# Patient Record
Sex: FEMALE | Race: WHITE | NOT HISPANIC OR LATINO | URBAN - METROPOLITAN AREA
[De-identification: names, ages, dates, MRNs, and addresses within clinical notes are randomized per-mention and may not be internally consistent; named-entity substitution may affect disease eponyms.]

---

## 2017-02-15 PROBLEM — K21.9 GASTROESOPHAGEAL REFLUX DISEASE: Status: ACTIVE | Noted: 2017-02-15

## 2018-01-22 ENCOUNTER — APPOINTMENT (RX ONLY)
Dept: URBAN - METROPOLITAN AREA CLINIC 23 | Facility: CLINIC | Age: 68
Setting detail: DERMATOLOGY
End: 2018-01-22

## 2018-01-22 DIAGNOSIS — L82.1 OTHER SEBORRHEIC KERATOSIS: ICD-10-CM

## 2018-01-22 DIAGNOSIS — L82.0 INFLAMED SEBORRHEIC KERATOSIS: ICD-10-CM

## 2018-01-22 DIAGNOSIS — L91.8 OTHER HYPERTROPHIC DISORDERS OF THE SKIN: ICD-10-CM

## 2018-01-22 DIAGNOSIS — T07XXXA OTHER AND UNSPECIFIED SUPERFICIAL INJURY OF OTHER, MULTIPLE, AND UNSPECIFIED SITES, WITHOUT MENTION OF INFECTION: ICD-10-CM

## 2018-01-22 DIAGNOSIS — L29.8 OTHER PRURITUS: ICD-10-CM

## 2018-01-22 DIAGNOSIS — D18.0 HEMANGIOMA: ICD-10-CM

## 2018-01-22 DIAGNOSIS — L81.4 OTHER MELANIN HYPERPIGMENTATION: ICD-10-CM

## 2018-01-22 DIAGNOSIS — L29.89 OTHER PRURITUS: ICD-10-CM

## 2018-01-22 PROBLEM — D18.01 HEMANGIOMA OF SKIN AND SUBCUTANEOUS TISSUE: Status: ACTIVE | Noted: 2018-01-22

## 2018-01-22 PROBLEM — S50.912A UNSPECIFIED SUPERFICIAL INJURY OF LEFT FOREARM, INITIAL ENCOUNTER: Status: ACTIVE | Noted: 2018-01-22

## 2018-01-22 PROBLEM — L55.1 SUNBURN OF SECOND DEGREE: Status: ACTIVE | Noted: 2018-01-22

## 2018-01-22 PROBLEM — L85.3 XEROSIS CUTIS: Status: ACTIVE | Noted: 2018-01-22

## 2018-01-22 PROBLEM — S50.911A UNSPECIFIED SUPERFICIAL INJURY OF RIGHT FOREARM, INITIAL ENCOUNTER: Status: ACTIVE | Noted: 2018-01-22

## 2018-01-22 PROCEDURE — ? TREATMENT REGIMEN

## 2018-01-22 PROCEDURE — ? REFERRAL CORRESPONDENCE

## 2018-01-22 PROCEDURE — ? COUNSELING

## 2018-01-22 PROCEDURE — 99202 OFFICE O/P NEW SF 15 MIN: CPT | Mod: 25

## 2018-01-22 PROCEDURE — 17110 DESTRUCTION B9 LES UP TO 14: CPT

## 2018-01-22 PROCEDURE — ? LIQUID NITROGEN

## 2018-01-22 ASSESSMENT — LOCATION DETAILED DESCRIPTION DERM
LOCATION DETAILED: LEFT SUPERIOR LATERAL MIDBACK
LOCATION DETAILED: SUPERIOR THORACIC SPINE
LOCATION DETAILED: RIGHT ANTERIOR PROXIMAL UPPER ARM
LOCATION DETAILED: LEFT ELBOW
LOCATION DETAILED: LEFT CENTRAL ZYGOMA
LOCATION DETAILED: RIGHT DISTAL POSTERIOR UPPER ARM
LOCATION DETAILED: LEFT DISTAL DORSAL FOREARM
LOCATION DETAILED: RIGHT INFERIOR UPPER BACK
LOCATION DETAILED: RIGHT PROXIMAL DORSAL FOREARM
LOCATION DETAILED: RIGHT ANTERIOR MEDIAL DISTAL UPPER ARM
LOCATION DETAILED: RIGHT DISTAL DORSAL FOREARM
LOCATION DETAILED: LEFT RADIAL DORSAL HAND
LOCATION DETAILED: RIGHT SUPERIOR MEDIAL UPPER BACK
LOCATION DETAILED: LEFT SUPERIOR CENTRAL MALAR CHEEK
LOCATION DETAILED: LEFT PROXIMAL DORSAL FOREARM
LOCATION DETAILED: RIGHT RADIAL DORSAL HAND

## 2018-01-22 ASSESSMENT — LOCATION ZONE DERM
LOCATION ZONE: FACE
LOCATION ZONE: HAND
LOCATION ZONE: TRUNK
LOCATION ZONE: ARM

## 2018-01-22 ASSESSMENT — LOCATION SIMPLE DESCRIPTION DERM
LOCATION SIMPLE: LEFT LOWER BACK
LOCATION SIMPLE: RIGHT HAND
LOCATION SIMPLE: UPPER BACK
LOCATION SIMPLE: RIGHT UPPER ARM
LOCATION SIMPLE: RIGHT FOREARM
LOCATION SIMPLE: LEFT HAND
LOCATION SIMPLE: LEFT ELBOW
LOCATION SIMPLE: LEFT ZYGOMA
LOCATION SIMPLE: LEFT CHEEK
LOCATION SIMPLE: LEFT FOREARM
LOCATION SIMPLE: RIGHT POSTERIOR UPPER ARM
LOCATION SIMPLE: RIGHT UPPER BACK

## 2018-01-22 NOTE — PROCEDURE: TREATMENT REGIMEN
Otc Regimen: Zyrtec 10mg QHS
Detail Level: Zone
Samples Given: CeraVe anti-itch cream
Plan: Limit soap to underarms and groin

## 2018-01-22 NOTE — PROCEDURE: LIQUID NITROGEN
Post-Care Instructions: I reviewed with the patient in detail post-care instructions. Patient is to wear sunprotection, and avoid picking at any of the treated lesions. Pt may apply Vaseline to crusted or scabbing areas.
Medical Necessity Information: It is in your best interest to select a reason for this procedure from the list below. All of these items fulfill various CMS LCD requirements except the new and changing color options.
Add 52 Modifier (Optional): no
Detail Level: Detailed
Consent: The patient's consent was obtained including but not limited to risks of crusting, scabbing, blistering, scarring, darker or lighter pigmentary change, recurrence, incomplete removal and infection.
Medical Necessity Clause: This procedure was medically necessary because the lesions that were treated were:

## 2018-03-02 ENCOUNTER — HOSPITAL ENCOUNTER (OUTPATIENT)
Dept: MAMMOGRAPHY | Age: 68
Discharge: HOME | End: 2018-03-02
Attending: INTERNAL MEDICINE
Payer: MEDICARE

## 2018-03-02 DIAGNOSIS — M89.9 BONE DISORDER: ICD-10-CM

## 2018-03-02 DIAGNOSIS — Z78.0 POST-MENOPAUSAL: ICD-10-CM

## 2018-03-02 DIAGNOSIS — Z12.31 ENCOUNTER FOR SCREENING MAMMOGRAM FOR MALIGNANT NEOPLASM OF BREAST: ICD-10-CM

## 2018-03-02 DIAGNOSIS — E66.9 OBESITY (BMI 35.0-39.9 WITHOUT COMORBIDITY): ICD-10-CM

## 2018-03-02 DIAGNOSIS — Z13.820 ENCOUNTER FOR SCREENING FOR OSTEOPOROSIS: ICD-10-CM

## 2018-03-02 PROCEDURE — 77080 DXA BONE DENSITY AXIAL: CPT

## 2018-03-02 PROCEDURE — 77067 SCR MAMMO BI INCL CAD: CPT

## 2019-05-15 ENCOUNTER — HOSPITAL ENCOUNTER (OUTPATIENT)
Dept: CT IMAGING | Age: 69
Discharge: HOME | End: 2019-05-15
Attending: INTERNAL MEDICINE
Payer: MEDICARE

## 2019-05-15 DIAGNOSIS — R29.6 MULTIPLE FALLS: ICD-10-CM

## 2019-05-15 DIAGNOSIS — R55 SYNCOPE, UNSPECIFIED SYNCOPE TYPE: ICD-10-CM

## 2019-05-15 DIAGNOSIS — R10.12 ABDOMINAL PAIN, LUQ (LEFT UPPER QUADRANT): ICD-10-CM

## 2019-05-15 PROCEDURE — 74011000258 HC RX REV CODE- 258: Performed by: INTERNAL MEDICINE

## 2019-05-15 PROCEDURE — 74011636320 HC RX REV CODE- 636/320: Performed by: INTERNAL MEDICINE

## 2019-05-15 PROCEDURE — 74177 CT ABD & PELVIS W/CONTRAST: CPT

## 2019-05-15 RX ORDER — SODIUM CHLORIDE 0.9 % (FLUSH) 0.9 %
10 SYRINGE (ML) INJECTION
Status: COMPLETED | OUTPATIENT
Start: 2019-05-15 | End: 2019-05-15

## 2019-05-15 RX ADMIN — IOPAMIDOL 100 ML: 755 INJECTION, SOLUTION INTRAVENOUS at 10:41

## 2019-05-15 RX ADMIN — DIATRIZOATE MEGLUMINE AND DIATRIZOATE SODIUM 15 ML: 660; 100 LIQUID ORAL; RECTAL at 10:41

## 2019-05-15 RX ADMIN — Medication 10 ML: at 10:41

## 2019-05-15 RX ADMIN — SODIUM CHLORIDE 100 ML: 900 INJECTION, SOLUTION INTRAVENOUS at 10:41

## 2019-06-06 ENCOUNTER — HOSPITAL ENCOUNTER (OUTPATIENT)
Dept: MRI IMAGING | Age: 69
Discharge: HOME | End: 2019-06-06
Attending: INTERNAL MEDICINE
Payer: MEDICARE

## 2019-06-06 DIAGNOSIS — R29.6 MULTIPLE FALLS: ICD-10-CM

## 2019-06-06 DIAGNOSIS — F41.8 DEPRESSION WITH ANXIETY: ICD-10-CM

## 2019-06-06 DIAGNOSIS — R55 SYNCOPE, UNSPECIFIED SYNCOPE TYPE: ICD-10-CM

## 2019-06-06 PROBLEM — E66.01 SEVERE OBESITY (HCC): Status: ACTIVE | Noted: 2019-06-06

## 2019-06-06 PROCEDURE — 70551 MRI BRAIN STEM W/O DYE: CPT

## 2019-06-11 ENCOUNTER — HOSPITAL ENCOUNTER (OUTPATIENT)
Dept: PHYSICAL THERAPY | Age: 69
End: 2019-06-11
Payer: MEDICARE

## 2019-06-20 ENCOUNTER — HOSPITAL ENCOUNTER (OUTPATIENT)
Dept: PHYSICAL THERAPY | Age: 69
Discharge: HOME | End: 2019-06-20
Payer: MEDICARE

## 2019-06-20 PROCEDURE — 97162 PT EVAL MOD COMPLEX 30 MIN: CPT

## 2019-06-20 PROCEDURE — 97530 THERAPEUTIC ACTIVITIES: CPT

## 2019-06-20 NOTE — PROGRESS NOTES
Andrew Morales  : 1950  [place]  [place] [address] [address].  [phone]   [phone]      OUTPATIENT PHYSICAL THERAPY: Daily Treatment Note  2019   ICD-10: Treatment Diagnosis:Muscle weakness (generalized) (M62.81)Other abnormalities of gait and mobility (R26.89       Effective Dates: 2019 TO 2019 (90 days). Frequency/Duration: 2 times a week for 90 Days  GOALS: (Goals have been discussed and agreed upon with patient.)  Short-Term Functional Goals: Time Frame: 4 weeks  1. Pt to report compliance with HEP. 2. Pt to decreased back pain with activity. Discharge Goals: Time Frame: 12 weeks  1. Pt to demonstrate quad strength for repeated sit to stand without compensation. 2. Pt to demonstrate hip strength for reciprocal pattern on stairs. 3. Pt to demonstrate > 15 sec SLS for safe amb all surfaces  _________________________________________________________________________  Pre-treatment Symptoms/Complaints:  Ms. Blake Person presents with core weakness and poor balance with multiple falls reported making her a continued high fall risk  Pain: Initial: 9/10 Post Session:  2/10   Medications Last Reviewed:  2019    Updated Objective Findings:     Strength:      4/5 hip ext, abd B        Functional Mobility:         Gait/Ambulation:  WNL level surfaces in clinic        Stairs:  Step-to pattern  Balance:          R 4 sec, L 7 sec     Tool Used: Lower Extremity Functional Scale (LEFS)  Score:  Initial:  Most Recent:  (Date: -- )   Interpretation of Score: 20 questions each scored on a 5 point scale with 0 representing \"extreme difficulty or unable to perform\" and 4 representing \"no difficulty\". The lower the score, the greater the functional disability. 80/80 represents no disability. Minimal detectable change is 9 points.      See evaluation note from today     TREATMENT:   THERAPEUTIC ACTIVITY: ( see below for minutes): Therapeutic activities per grid below to improve mobility. Required moderate verbal and manual cues to improve functional mobility . THERAPEUTIC EXERCISE: (see below for minutes):  Exercises per grid below to improve strength. Required moderate verbal and manual cues to promote proper body alignment, promote proper body posture, promote proper body mechanics and promote proper body breathing techniques. Progressed resistance, range, repetitions and complexity of movement as indicated. MANUAL THERAPY: (see below for minutes): Joint mobilization and Soft tissue mobilization was utilized and necessary because of the patient's restricted joint motion, painful spasm, loss of articular motion and restricted motion of soft tissue. MODALITIES: (see below for minutes):      for pain modulation       Date: 6/20/19       Modalities:                        Therapeutic Exercise: 5 mins       Glut set X 20                                               Manual Therapy:                        Therapeutic Activities: 25 mins       Pt education, postural education, HEP 10 mins       Bridging without hams, paraspinals X 15       Sit to stand without UE support X 10       SLS B X 3         HEP: see hand out    [facility] Portal  Treatment/Session Summary:    · Response to Treatment:  Pt had good understanding on ex's and their rationale. She reported much less pain after ex's. · Communication/Consultation:  None today  · Equipment provided today:  None today  · Recommendations/Intent for next treatment session: Next visit will focus on strengthening and balance as indicated.     Total Treatment Billable Duration:  45 mins  PT Patient Time In/Time Out  Time In: 0800  Time Out: 0845    Allison Lucas PT

## 2019-06-20 NOTE — THERAPY EVALUATION
Marylou Crain  : 1950 [id number] [facility]  [address] [place] [address].  [phone]   [phone]        OUTPATIENT PHYSICAL THERAPY:Initial Assessment 2019         ICD-10: Treatment Diagnosis:Muscle weakness (generalized) (M62.81)Other abnormalities of gait and mobility (R26.89)  Precautions/Allergies:   Demerol [meperidine] and Other medication   Fall Risk Score:     Ambulatory/Rehab Services H2 Model Falls Risk Assessment    Risk Factors:       (5)  History of Recent Falls [w/in 3 months] Ability to Rise from Chair:       (0)  Ability to rise in a single movement    Falls Prevention Plan:       No modifications necessary   Total: (5 or greater = High Risk): 5     [facility]. All Rights Reserved. [facility] [employer] Patent #[id number]. Federal Law prohibits the replication, distribution or use without written permission from [facility] [employer]     MD Orders: eval and treat MEDICAL/REFERRING DIAGNOSIS:  Repeated falls [R29.6]   DATE OF ONSET: months  REFERRING PHYSICIAN: Bk Polanco MD  RETURN PHYSICIAN APPOINTMENT: unknown     INITIAL ASSESSMENT:  Ms. Jefferson Irwin presents with core weakness and poor balance with multiple falls reported making her a continued high fall risk. She will benefit from skilled PT to restore strength and balance to return her to safe ambulation on all surfaces. PROBLEM LIST (Impacting functional limitations):  1. Decreased Strength  2. Decreased ADL/Functional Activities  3. Decreased Ambulation Ability/Technique  4. Decreased Balance  5. Increased Pain  6. Decreased [place] with Home Exercise Program INTERVENTIONS PLANNED:  1. Balance Exercise  2. Home Exercise Program (HEP)  3. Therapeutic Activites  4. Therapeutic Exercise/Strengthening    TREATMENT PLAN:  Effective Dates: 2019 TO 2019 (90 days).  Frequency/Duration: 2 times a week for 90 Days  GOALS: (Goals have been discussed and agreed upon with patient.)  Short-Term Functional Goals: Time Frame: 4 weeks  1. Pt to report compliance with HEP. 2. Pt to decreased back pain with activity. Discharge Goals: Time Frame: 12 weeks  1. Pt to demonstrate quad strength for repeated sit to stand without compensation. 2. Pt to demonstrate hip strength for reciprocal pattern on stairs. 3. Pt to demonstrate > 15 sec SLS for safe amb all surfaces. Rehabilitation Potential For Stated Goals: Good  Regarding Tian Steven's therapy, I certify that the treatment plan above will be carried out by a therapist or under their direction. Thank you for this referral,  Tariq Dennison, PT       Referring Physician Signature: Bk Polanco MD              Date                      HISTORY:   History of Present Injury/Illness (Reason for Referral):  3 recent falls with no known reason. No dizziness, tripping, or awareness of anything going on. Previously tripped a few times going up her stairs, catching her toe on the lip of the stair. Having pain in left post-lat trunk. Multiple x-rays, CT scan of trunk, EEG, MRI of head, has seen cardiologist.  Does favor the left leg as weaker with stairs. Diagnosed with OA in the left knee. Past Medical History/Comorbidities:   Ms. Jefferson Irwin  has a past medical history of Depression, Depression with anxiety (12/17/2015), Gastroesophageal reflux disease (2/15/2017), Local reaction to bee sting (6-2016), Obesity, CANDIE (obstructive sleep apnea), CANDIE (obstructive sleep apnea), and Prediabetes.  She also has no past medical history of Arrhythmia, Arthritis, Asthma, Autoimmune disease ([address] CAD (coronary artery disease), Cancer ([address] Chronic kidney disease, Chronic pain, Coagulation defects, COPD, Diabetes ([address] Difficult intubation, GERD (gastroesophageal reflux disease), Heart failure ([address] Hypertension, Liver disease, Malignant hyperthermia due to anesthesia, Nausea & vomiting, Other ill-defined conditions(799.89), Pseudocholinesterase deficiency, PUD (peptic ulcer disease), Seizures ([facility] Stroke ([facility] Thromboembolus ([facility] [address] [facility] Thyroid disease, Unspecified adverse effect of anesthesia, or Unspecified sleep apnea. Ms. Ninfa Godoy  has a past surgical history that includes hx appendectomy; hx bunionectomy (Bilateral); hx wisdom teeth extraction; hx tonsillectomy; hx adenoidectomy; and hx hysterectomy. Social History/Living Environment:     pt lives with  in 2 story home  Prior Level of Function/Work/Activity:  retired  Dominant Side:         RIGHT  Current Medications:    Current Outpatient Medications:     omeprazole (PRILOSEC) 20 mg capsule, Take 1 Cap by mouth daily. , Disp: 30 Cap, Rfl: 1    FLUoxetine (PROZAC) 10 mg capsule, Take 1 Cap by mouth daily. (Patient taking differently: Take 20 mg by mouth daily.), Disp: 30 Cap, Rfl: 1    naproxen sodium (ALEVE) 220 mg cap, Take  by mouth., Disp: , Rfl:     triamcinolone acetonide (KENALOG) 0.1 % topical cream, use thin layer to affected area twice daily for 1 week then reduce to daily as needed, Disp: 45 g, Rfl: 0    EPIPEN 2-JOSUE 0.3 mg/0.3 mL injection, , Disp: , Rfl: 0    triamcinolone (NASACORT AQ) 55 mcg nasal inhaler, 2 Sprays by Both Nostrils route daily. , Disp: , Rfl:     sodium chloride (OCEAN) 0.65 % nasal spray, 1 Spray as needed for Congestion. , Disp: , Rfl:    Date Last Reviewed:  6/20/2019   # of Personal Factors/Comorbidities that affect the Plan of Care: 1-2: MODERATE COMPLEXITY   EXAMINATION:   Observation/Orthostatic Postural Assessment:          Noncontributory   Palpation:          unremarkable  ROM:     WFL      Strength:     4/5 hip ext, abd B      Neurological Screen:        unremarkable  Functional Mobility:         Gait/Ambulation:  WNL level surfaces in clinic        Stairs:  Step-to pattern  Balance:          R 4 sec, L 7 sec    Body Structures Involved:  1. Joints  2.  Muscles Body Functions Affected:  1. Sensory/Pain  2. Movement Related Activities and Participation Affected:  1. General Tasks and Demands  2. Self Care  3. Domestic Life  4. Community, Social and [place]   # of elements that affect the Plan of Care: 3: MODERATE COMPLEXITY   CLINICAL PRESENTATION:   Presentation: Evolving clinical presentation with changing clinical characteristics: MODERATE COMPLEXITY   CLINICAL DECISION MAKING:   Tool Used: Lower Extremity Functional Scale (LEFS)  Score:  Initial: 58/80 Most Recent: X/80 (Date: -- )   Interpretation of Score: 20 questions each scored on a 5 point scale with 0 representing \"extreme difficulty or unable to perform\" and 4 representing \"no difficulty\". The lower the score, the greater the functional disability. 80/80 represents no disability. Minimal detectable change is 9 points. Medical Necessity:   · Patient demonstrates good rehab potential due to higher previous functional level. Reason for Services/Other Comments:  · Patient continues to require present interventions due to patient's inability to walk without falls.    Use of outcome tool(s) and clinical judgement create a POC that gives a: Questionable prediction of patient's progress: MODERATE COMPLEXITY     Total Treatment Duration:  PT Patient Time In/Time Out  Time In: 0800  Time Out: 0845     Ge Lucas, PT

## 2019-06-25 ENCOUNTER — HOSPITAL ENCOUNTER (OUTPATIENT)
Dept: PHYSICAL THERAPY | Age: 69
Discharge: HOME | End: 2019-06-25
Payer: MEDICARE

## 2019-06-25 PROCEDURE — 97530 THERAPEUTIC ACTIVITIES: CPT

## 2019-06-25 PROCEDURE — 97110 THERAPEUTIC EXERCISES: CPT

## 2019-06-25 NOTE — PROGRESS NOTES
Scar Storey  : 1950  [place]  Secondary: [place] [address] @ [address]  [address] [address].  [phone]   [phone]      OUTPATIENT PHYSICAL THERAPY: Daily Treatment Note  2019   ICD-10: Treatment Diagnosis:Muscle weakness (generalized) (M62.81)Other abnormalities of gait and mobility (R26.89       Effective Dates: 2019 TO 2019 (90 days). Frequency/Duration: 2 times a week for 90 Days  GOALS: (Goals have been discussed and agreed upon with patient.)  Short-Term Functional Goals: Time Frame: 4 weeks  1. Pt to report compliance with HEP. 2. Pt to decreased back pain with activity. Discharge Goals: Time Frame: 12 weeks  1. Pt to demonstrate quad strength for repeated sit to stand without compensation. 2. Pt to demonstrate hip strength for reciprocal pattern on stairs. 3. Pt to demonstrate > 15 sec SLS for safe amb all surfaces  _________________________________________________________________________  Pre-treatment Symptoms/Complaints:  I have been working on it. All this time I thought it was my left knee but it looks like my R leg is weak too. Pain: Initial: 3/10 Post Session:  Hips are sore but no pain   Medications Last Reviewed:  2019    Updated Objective Findings:     Palpation:  Tender B greater trochanters    Strength:      4/5 hip ext, abd B        Functional Mobility:         Gait/Ambulation:  WNL level surfaces in clinic        Stairs:  Step-to pattern  Balance:          R 4 sec, L 7 sec     Tool Used: Lower Extremity Functional Scale (LEFS)  Score:  Initial: 58/80 Most Recent:  (Date: -- )   Interpretation of Score: 20 questions each scored on a 5 point scale with 0 representing \"extreme difficulty or unable to perform\" and 4 representing \"no difficulty\". The lower the score, the greater the functional disability. 80/80 represents no disability. Minimal detectable change is 9 points. TREATMENT:   THERAPEUTIC ACTIVITY: ( see below for minutes): Therapeutic activities per grid below to improve mobility. Required moderate verbal and manual cues to improve functional mobility . THERAPEUTIC EXERCISE: (see below for minutes):  Exercises per grid below to improve strength. Required moderate verbal and manual cues to promote proper body alignment, promote proper body posture, promote proper body mechanics and promote proper body breathing techniques. Progressed resistance, range, repetitions and complexity of movement as indicated. MANUAL THERAPY: (see below for minutes): Joint mobilization and Soft tissue mobilization was utilized and necessary because of the patient's restricted joint motion, painful spasm, loss of articular motion and restricted motion of soft tissue. MODALITIES: (see below for minutes):      for pain modulation       Date: 6/20/19 6/25/19  Visit 2      Modalities:                        Therapeutic Exercise: 5 mins 15 mins      Glut set X 20 X 15      S/L hip abd B  X 30      Clams B  X 30                              Manual Therapy:                        Therapeutic Activities: 25 mins 30 mins      Pt education, postural education, HEP 10 mins       Bridging without hams, paraspinals X 15 X 30      Sit to stand without UE support X 10 X 15      SLS B, holding pelvis level X 3 X 5      Tandem stance B  X 3        HEP: see hand out    [facility] Portal  Treatment/Session Summary:    · Response to Treatment: Pt has good ex nani and understanding. Able to hold pelvis level while performing SLS for balance, functionally training glut med control. · Communication/Consultation:  None today  · Equipment provided today:  None today  · Recommendations/Intent for next treatment session: Next visit will focus on strengthening and balance as indicated.     Total Treatment Billable Duration:  45 mins  PT Patient Time In/Time Out  Time In: 6992  Time Out: 600 [address] [place] [address]

## 2019-06-28 ENCOUNTER — HOSPITAL ENCOUNTER (OUTPATIENT)
Dept: PHYSICAL THERAPY | Age: 69
Discharge: HOME | End: 2019-06-28
Payer: MEDICARE

## 2019-06-28 PROCEDURE — 97110 THERAPEUTIC EXERCISES: CPT

## 2019-06-28 PROCEDURE — 97530 THERAPEUTIC ACTIVITIES: CPT

## 2019-06-28 NOTE — PROGRESS NOTES
Danilele Reach  : 1950  [place]  Secondary: [place] [address] @ [address], [facility] [place].  [phone]   [phone]      OUTPATIENT PHYSICAL THERAPY: Daily Treatment Note  2019   ICD-10: Treatment Diagnosis:Muscle weakness (generalized) (M62.81)Other abnormalities of gait and mobility (R26.89       Effective Dates: 2019 TO 2019 (90 days). Frequency/Duration: 2 times a week for 90 Days  GOALS: (Goals have been discussed and agreed upon with patient.)  Short-Term Functional Goals: Time Frame: 4 weeks  1. Pt to report compliance with HEP. 2. Pt to decreased back pain with activity. Discharge Goals: Time Frame: 12 weeks  1. Pt to demonstrate quad strength for repeated sit to stand without compensation. 2. Pt to demonstrate hip strength for reciprocal pattern on stairs. 3. Pt to demonstrate > 15 sec SLS for safe amb all surfaces  _________________________________________________________________________  Pre-treatment Symptoms/Complaints:  I am sore in my hips and a little in my back. Pain: Initial: 3/10 Post Session:  No pain, just some soreness   Medications Last Reviewed:  2019    Updated Objective Findings:     Palpation:  Tender B greater trochanters;  Marked paraspinal tightness B    Strength:      4/5 hip ext, abd B        Functional Mobility:         Gait/Ambulation:  WNL level surfaces in clinic        Stairs:  Step-to pattern  Balance:          R 4 sec, L 7 sec     Tool Used: Lower Extremity Functional Scale (LEFS)  Score:  Initial:  Most Recent:  (Date: -- )   Interpretation of Score: 20 questions each scored on a 5 point scale with 0 representing \"extreme difficulty or unable to perform\" and 4 representing \"no difficulty\". The lower the score, the greater the functional disability. 80/80 represents no disability.   Minimal detectable change is 9 points. TREATMENT:   THERAPEUTIC ACTIVITY: ( see below for minutes): Therapeutic activities per grid below to improve mobility. Required moderate verbal and manual cues to improve functional mobility . THERAPEUTIC EXERCISE: (see below for minutes):  Exercises per grid below to improve strength. Required moderate verbal and manual cues to promote proper body alignment, promote proper body posture, promote proper body mechanics and promote proper body breathing techniques. Progressed resistance, range, repetitions and complexity of movement as indicated. MANUAL THERAPY: (see below for minutes): Joint mobilization and Soft tissue mobilization was utilized and necessary because of the patient's restricted joint motion, painful spasm, loss of articular motion and restricted motion of soft tissue. MODALITIES: (see below for minutes):      for pain modulation       Date: 6/20/19 6/25/19  Visit 2 6/28/19  Visit 3       Modalities:                              Therapeutic Exercise: 5 mins 15 mins 15 mins       Glut set X 20 X 15 X 15       S/L hip abd B  X 30 X 30       Clams B  X 30 X 30                                     Manual Therapy:                              Therapeutic Activities: 25 mins 30 mins 30 mins       Pt education, postural education, HEP 10 mins         Bridging without hams, paraspinals X 15 X 30 X 30       Sit to stand without UE support X 10 X 15 X 20       SLS B, holding pelvis level X 3 X 5 X 5       Tandem stance B  X 3 X 2   twists         HEP: see hand out    [facility] Portal  Treatment/Session Summary:    · Response to Treatment: Pt is showing good ex nani. Over recruits back so focusing on keeping it relaxed while she engages gluts and quads more. · Communication/Consultation:  None today  · Equipment provided today:  None today  · Recommendations/Intent for next treatment session: Next visit will focus on strengthening and balance as indicated.     Total Treatment Billable Duration:  45 mins  PT Patient Time In/Time Out  Time In: 7142  Time Out: 0500    Tanna Lucas, PT

## 2019-07-02 ENCOUNTER — HOSPITAL ENCOUNTER (OUTPATIENT)
Dept: PHYSICAL THERAPY | Age: 69
Discharge: HOME | End: 2019-07-02
Payer: MEDICARE

## 2019-07-02 PROCEDURE — 97530 THERAPEUTIC ACTIVITIES: CPT

## 2019-07-02 PROCEDURE — 97110 THERAPEUTIC EXERCISES: CPT

## 2019-07-02 NOTE — PROGRESS NOTES
Tyrel Sky  : 1950  [place]  Secondary: [place] [address] @ [address], [place].  [phone]   [phone]      OUTPATIENT PHYSICAL THERAPY: Daily Treatment Note  2019   ICD-10: Treatment Diagnosis:Muscle weakness (generalized) (M62.81)Other abnormalities of gait and mobility (R26.89       Effective Dates: 2019 TO 2019 (90 days). Frequency/Duration: 2 times a week for 90 Days  GOALS: (Goals have been discussed and agreed upon with patient.)  Short-Term Functional Goals: Time Frame: 4 weeks  1. Pt to report compliance with HEP. 2. Pt to decreased back pain with activity. Discharge Goals: Time Frame: 12 weeks  1. Pt to demonstrate quad strength for repeated sit to stand without compensation. 2. Pt to demonstrate hip strength for reciprocal pattern on stairs. 3. Pt to demonstrate > 15 sec SLS for safe amb all surfaces  _________________________________________________________________________  Pre-treatment Symptoms/Complaints: I still have trouble balancing on the right leg. Pain: Initial: 0/10 Post Session:  0/10   Medications Last Reviewed:  2019    Updated Objective Findings:     Palpation:  Tender B greater trochanters;  Marked paraspinal tightness B    Strength:      4/5 hip ext, abd B        Functional Mobility:         Gait/Ambulation:  WNL level surfaces in clinic        Stairs:  Step-to pattern  Balance:          R 17 sec, L 20 sec 19    Tool Used: Lower Extremity Functional Scale (LEFS)  Score:  Initial: 58/80 Most Recent:  (Date: -- )   Interpretation of Score: 20 questions each scored on a 5 point scale with 0 representing \"extreme difficulty or unable to perform\" and 4 representing \"no difficulty\". The lower the score, the greater the functional disability. 80/80 represents no disability. Minimal detectable change is 9 points. TREATMENT:   THERAPEUTIC ACTIVITY: ( see below for minutes): Therapeutic activities per grid below to improve mobility. Required moderate verbal and manual cues to improve functional mobility . THERAPEUTIC EXERCISE: (see below for minutes):  Exercises per grid below to improve strength. Required moderate verbal and manual cues to promote proper body alignment, promote proper body posture, promote proper body mechanics and promote proper body breathing techniques. Progressed resistance, range, repetitions and complexity of movement as indicated. MANUAL THERAPY: (see below for minutes): Joint mobilization and Soft tissue mobilization was utilized and necessary because of the patient's restricted joint motion, painful spasm, loss of articular motion and restricted motion of soft tissue. MODALITIES: (see below for minutes):      for pain modulation       Date: 6/20/19 6/25/19  Visit 2 6/28/19  Visit 3 7/2/19  Visit 4      Modalities:                              Therapeutic Exercise: 5 mins 15 mins 15 mins 10 mins      Glut set X 20 X 15 X 15       S/L hip abd B  X 30 X 30 X 30      Clams B  X 30 X 30 X 30                                    Manual Therapy:                              Therapeutic Activities: 25 mins 30 mins 30 mins 35 mins      Pt education, postural education, HEP 10 mins         Bridging without hams, paraspinals X 15 X 30 X 30 X 30      Sit to stand without UE support X 10 X 15 X 20 X 20      SLS B, holding pelvis level X 3 X 5 X 5 X 5      Tandem stance B  X 3 X 2   twists X 2   Twists  Floor and foam        HEP: see hand out    [facility] Portal  Treatment/Session Summary:    · Response to Treatment:  Pt has shown good progress with balance. Continue as indicated. · Communication/Consultation:  None today  · Equipment provided today:  None today  · Recommendations/Intent for next treatment session: Next visit will focus on strengthening and balance as indicated.     Total Treatment Billable Duration:  45 mins  PT Patient Time In/Time Out  Time In: 0800  Time Out: 0845    Aleks Lucas, PT

## 2019-07-05 ENCOUNTER — HOSPITAL ENCOUNTER (OUTPATIENT)
Dept: PHYSICAL THERAPY | Age: 69
Discharge: HOME | End: 2019-07-05
Payer: MEDICARE

## 2019-07-05 PROCEDURE — 97110 THERAPEUTIC EXERCISES: CPT

## 2019-07-05 PROCEDURE — 97530 THERAPEUTIC ACTIVITIES: CPT

## 2019-07-05 NOTE — PROGRESS NOTES
Wilmar Cadena  : 1950  [place]  Secondary: [place] [address] E [address] @ [place] [address].  [phone]   [phone]      OUTPATIENT PHYSICAL THERAPY: Daily Treatment Note  2019   ICD-10: Treatment Diagnosis:Muscle weakness (generalized) (M62.81)Other abnormalities of gait and mobility (R26.89       Effective Dates: 2019 TO 2019 (90 days). Frequency/Duration: 2 times a week for 90 Days  GOALS: (Goals have been discussed and agreed upon with patient.)  Short-Term Functional Goals: Time Frame: 4 weeks  1. Pt to report compliance with HEP. 2. Pt to decreased back pain with activity. Discharge Goals: Time Frame: 12 weeks  1. Pt to demonstrate quad strength for repeated sit to stand without compensation. 2. Pt to demonstrate hip strength for reciprocal pattern on stairs. 3. Pt to demonstrate > 15 sec SLS for safe amb all surfaces  _________________________________________________________________________  Pre-treatment Symptoms/Complaints:  I am doing okay for the most part. No real issues lately. Pain: Initial: 0/10 Post Session:  0/10   Medications Last Reviewed:  2019    Updated Objective Findings:     Palpation:  Tender B greater trochanters;  Marked paraspinal tightness B    Strength:      4/5 hip ext, abd B        Functional Mobility:         Gait/Ambulation:  WNL level surfaces in clinic        Stairs:  Step-to pattern  Balance:          R 17 sec, L 20 sec 19    Tool Used: Lower Extremity Functional Scale (LEFS)  Score:  Initial: 58/80 Most Recent:  (Date: -- )   Interpretation of Score: 20 questions each scored on a 5 point scale with 0 representing \"extreme difficulty or unable to perform\" and 4 representing \"no difficulty\". The lower the score, the greater the functional disability. 80/80 represents no disability. Minimal detectable change is 9 points. TREATMENT:   THERAPEUTIC ACTIVITY: ( see below for minutes): Therapeutic activities per grid below to improve mobility. Required moderate verbal and manual cues to improve functional mobility . THERAPEUTIC EXERCISE: (see below for minutes):  Exercises per grid below to improve strength. Required moderate verbal and manual cues to promote proper body alignment, promote proper body posture, promote proper body mechanics and promote proper body breathing techniques. Progressed resistance, range, repetitions and complexity of movement as indicated. MANUAL THERAPY: (see below for minutes): Joint mobilization and Soft tissue mobilization was utilized and necessary because of the patient's restricted joint motion, painful spasm, loss of articular motion and restricted motion of soft tissue. MODALITIES: (see below for minutes):      for pain modulation       Date: 6/20/19 6/25/19  Visit 2 6/28/19  Visit 3 7/2/19  Visit 4 7/5/19  Visit 5     Modalities:                              Therapeutic Exercise: 5 mins 15 mins 15 mins 10 mins 15 mins     Glut set X 20 X 15 X 15       S/L hip abd B  X 30 X 30 X 30 X 30     Clams B  X 30 X 30 X 30 X 30     Calf raises     X 30                         Manual Therapy:                              Therapeutic Activities: 25 mins 30 mins 30 mins 35 mins 30 mins     Pt education, postural education, HEP 10 mins         Bridging without hams, paraspinals X 15 X 30 X 30 X 30 X 30     Sit to stand without UE support X 10 X 15 X 20 X 20 X 20     SLS B, holding pelvis level X 3 X 5 X 5 X 5 X 5     Tandem stance B  X 3 X 2   twists X 2   Twists  Floor and foam X 2 twists floor and foam     Step ups ant B     6 in x 20       HEP: see hand out    [employer] Portal  Treatment/Session Summary:    · Response to Treatment:  Pt is showing steady progress with strength and balance.     · Communication/Consultation:  None today  · Equipment provided today:  None today  · Recommendations/Intent for next treatment session: Next visit will focus on strengthening and balance as indicated.     Total Treatment Billable Duration:  45 mins  PT Patient Time In/Time Out  Time In: 0930  Time Out: 1015    Karishma Lucas, PT

## 2019-07-08 PROCEDURE — 88305 TISSUE EXAM BY PATHOLOGIST: CPT

## 2019-07-09 ENCOUNTER — HOSPITAL ENCOUNTER (OUTPATIENT)
Dept: PHYSICAL THERAPY | Age: 69
Discharge: HOME | End: 2019-07-09
Payer: MEDICARE

## 2019-07-09 ENCOUNTER — HOSPITAL ENCOUNTER (OUTPATIENT)
Dept: LAB | Age: 69
Discharge: HOME | End: 2019-07-09

## 2019-07-09 PROCEDURE — 97110 THERAPEUTIC EXERCISES: CPT

## 2019-07-09 PROCEDURE — 97530 THERAPEUTIC ACTIVITIES: CPT

## 2019-07-09 NOTE — PROGRESS NOTES
Mark Anthony Celestin  : 1950  [place]  Secondary: Sohan Robbins @ [address]  [phone]   [phone]      OUTPATIENT PHYSICAL THERAPY: Daily Treatment Note  2019   ICD-10: Treatment Diagnosis:Muscle weakness (generalized) (M62.81)Other abnormalities of gait and mobility (R26.89       Effective Dates: 2019 TO 2019 (90 days). Frequency/Duration: 2 times a week for 90 Days  GOALS: (Goals have been discussed and agreed upon with patient.)  Short-Term Functional Goals: Time Frame: 4 weeks  1. Pt to report compliance with HEP. 2. Pt to decreased back pain with activity. Discharge Goals: Time Frame: 12 weeks  1. Pt to demonstrate quad strength for repeated sit to stand without compensation. 2. Pt to demonstrate hip strength for reciprocal pattern on stairs. 3. Pt to demonstrate > 15 sec SLS for safe amb all surfaces  _________________________________________________________________________  Pre-treatment Symptoms/Complaints:  I am doing okay actually. Pain: Initial: 0/10 Post Session:  0/10   Medications Last Reviewed:  2019    Updated Objective Findings:     Palpation:  Tender B greater trochanters;  Marked paraspinal tightness B    Strength:      4/5 hip ext, abd B        Functional Mobility:         Gait/Ambulation:  WNL level surfaces in clinic        Stairs:  Step-to pattern  Balance:          R 17 sec, L 20 sec 19    Tool Used: Lower Extremity Functional Scale (LEFS)  Score:  Initial: 58/80 Most Recent:  (Date: -- )   Interpretation of Score: 20 questions each scored on a 5 point scale with 0 representing \"extreme difficulty or unable to perform\" and 4 representing \"no difficulty\". The lower the score, the greater the functional disability. 80/80 represents no disability. Minimal detectable change is 9 points.       TREATMENT:   THERAPEUTIC ACTIVITY: ( see below for minutes): Therapeutic activities per grid below to improve mobility. Required moderate verbal and manual cues to improve functional mobility . THERAPEUTIC EXERCISE: (see below for minutes):  Exercises per grid below to improve strength. Required moderate verbal and manual cues to promote proper body alignment, promote proper body posture, promote proper body mechanics and promote proper body breathing techniques. Progressed resistance, range, repetitions and complexity of movement as indicated. MANUAL THERAPY: (see below for minutes): Joint mobilization and Soft tissue mobilization was utilized and necessary because of the patient's restricted joint motion, painful spasm, loss of articular motion and restricted motion of soft tissue. MODALITIES: (see below for minutes):      for pain modulation       Date: 6/20/19 6/25/19  Visit 2 6/28/19  Visit 3 7/2/19  Visit 4 7/5/19  Visit 5 7/9/19  Visit 6     Modalities:                                 Therapeutic Exercise: 5 mins 15 mins 15 mins 10 mins 15 mins 15 mins     Glut set X 20 X 15 X 15        S/L hip abd B  X 30 X 30 X 30 X 30 X 30     Clams B  X 30 X 30 X 30 X 30 X 30     Calf raises     X 30 X 30                           Manual Therapy:                                 Therapeutic Activities: 25 mins 30 mins 30 mins 35 mins 30 mins 30 mins     Pt education, postural education, HEP 10 mins          Bridging without hams, paraspinals X 15 X 30 X 30 X 30 X 30 X 30     Sit to stand without UE support X 10 X 15 X 20 X 20 X 20 X 20     SLS B, holding pelvis level X 3 X 5 X 5 X 5 X 5 X 5     Tandem stance B  X 3 X 2   twists X 2   Twists  Floor and foam X 2 twists floor and foam X 2 twists floor and foam     Step ups ant B     6 in x 20 6 in x 20       HEP: see hand out    [employer] Portal  Treatment/Session Summary:    · Response to Treatment:    Pt is showing consistent improvement with strength and balance. · Communication/Consultation:  None today  · Equipment provided today:  None today  · Recommendations/Intent for next treatment session: Next visit will focus on strengthening and balance as indicated.     Total Treatment Billable Duration:  45 mins  PT Patient Time In/Time Out  Time In: 0800  Time Out: 0845    Shantal Lucas, PT

## 2019-07-11 PROBLEM — M17.0 OSTEOARTHRITIS OF BOTH KNEES: Status: ACTIVE | Noted: 2019-07-11

## 2019-07-11 PROBLEM — M53.9 DISORDER OF THORACIC SPINE: Status: ACTIVE | Noted: 2019-07-11

## 2019-07-11 PROBLEM — K57.90 DIVERTICULOSIS: Status: ACTIVE | Noted: 2019-07-11

## 2019-07-11 PROBLEM — Z91.81 HISTORY OF FALLING: Status: ACTIVE | Noted: 2019-07-11

## 2019-07-12 ENCOUNTER — HOSPITAL ENCOUNTER (OUTPATIENT)
Dept: PHYSICAL THERAPY | Age: 69
Discharge: HOME | End: 2019-07-12
Payer: MEDICARE

## 2019-07-12 PROCEDURE — 97140 MANUAL THERAPY 1/> REGIONS: CPT

## 2019-07-12 PROCEDURE — 97110 THERAPEUTIC EXERCISES: CPT

## 2019-07-12 NOTE — PROGRESS NOTES
Mihir Jade  : 1950  [place]  Secondary: [place] Maria E Robbins @ [address]  [phone]   [phone]      OUTPATIENT PHYSICAL THERAPY: Daily Treatment Note  2019   ICD-10: Treatment Diagnosis:Muscle weakness (generalized) (M62.81)Other abnormalities of gait and mobility (R26.89       Effective Dates: 2019 TO 2019 (90 days). Frequency/Duration: 2 times a week for 90 Days  GOALS: (Goals have been discussed and agreed upon with patient.)  Short-Term Functional Goals: Time Frame: 4 weeks  1. Pt to report compliance with HEP. 2. Pt to decreased back pain with activity. Discharge Goals: Time Frame: 12 weeks  1. Pt to demonstrate quad strength for repeated sit to stand without compensation. 2. Pt to demonstrate hip strength for reciprocal pattern on stairs. 3. Pt to demonstrate > 15 sec SLS for safe amb all surfaces  _________________________________________________________________________  Pre-treatment Symptoms/Complaints: Both hips were hurting yesterday in the front and I think my balance was affected by it. It feels like I can't get going. It seems prolonged sitting does it. Not noticing it yet today. Pain: Initial: 0/10 Post Session:  0/10   Medications Last Reviewed:  2019    Updated Objective Findings:     Palpation:  Tender B greater trochanters;  Marked paraspinal tightness B    Strength:      4/5 hip ext, abd B        Functional Mobility:         Gait/Ambulation:  WNL level surfaces in clinic        Stairs:  Step-to pattern  Balance:          R 17 sec, L 20 sec 19    Tool Used: Lower Extremity Functional Scale (LEFS)  Score:  Initial: 58/80 Most Recent:  (Date: -- )   Interpretation of Score: 20 questions each scored on a 5 point scale with 0 representing \"extreme difficulty or unable to perform\" and 4 representing \"no difficulty\". The lower the score, the greater the functional disability. 80/80 represents no disability. Minimal detectable change is 9 points. TREATMENT:   THERAPEUTIC ACTIVITY: ( see below for minutes): Therapeutic activities per grid below to improve mobility. Required moderate verbal and manual cues to improve functional mobility . THERAPEUTIC EXERCISE: (see below for minutes):  Exercises per grid below to improve strength. Required moderate verbal and manual cues to promote proper body alignment, promote proper body posture, promote proper body mechanics and promote proper body breathing techniques. Progressed resistance, range, repetitions and complexity of movement as indicated. MANUAL THERAPY: (see below for minutes): Joint mobilization and Soft tissue mobilization was utilized and necessary because of the patient's restricted joint motion, painful spasm, loss of articular motion and restricted motion of soft tissue.    MODALITIES: (see below for minutes):      for pain modulation       Date: 6/20/19 6/25/19  Visit 2 6/28/19  Visit 3 7/2/19  Visit 4 7/5/19  Visit 5 7/9/19  Visit 6 7/12/19  Visit 7    Modalities:                      Therapeutic Exercise: 5 mins 15 mins 15 mins 10 mins 15 mins 15 mins 30 mins    Glut set X 20 X 15 X 15        S/L hip abd B  X 30 X 30 X 30 X 30 X 30 X 30    Clams B  X 30 X 30 X 30 X 30 X 30 X 30    Calf raises     X 30 X 30 X 30    LTR       X 10    Lower abdominal engagementt       3 mins    John stretch B       2 mins each               Manual Therapy:       10 mins    STM hip flex B       supine               Therapeutic Activities: 25 mins 30 mins 30 mins 35 mins 30 mins 30 mins 5 mins    Pt education, postural education, HEP 10 mins          Bridging without hams, paraspinals X 15 X 30 X 30 X 30 X 30 X 30 X 30    Sit to stand without UE support X 10 X 15 X 20 X 20 X 20 X 20 X 20    SLS B, holding pelvis level X 3 X 5 X 5 X 5 X 5 X 5     Tandem stance B  X 3 X 2   twists X 2   Twists  Floor and foam X 2 twists floor and foam X 2 twists floor and foam     Step ups ant B     6 in x 20 6 in x 20       HEP: see hand out    [employer] Portal  Treatment/Session Summary:    · Response to Treatment:     Pt noted to have tenderness in B hip flex with positive FADIR B. Good ex nani and effort. · Communication/Consultation:  None today  · Equipment provided today:  None today  · Recommendations/Intent for next treatment session: Next visit will focus on strengthening and balance as indicated.     Total Treatment Billable Duration:  45 mins  PT Patient Time In/Time Out  Time In: 0800  Time Out: 0845    Rona uLcas, PT

## 2019-07-16 ENCOUNTER — HOSPITAL ENCOUNTER (OUTPATIENT)
Dept: PHYSICAL THERAPY | Age: 69
Discharge: HOME | End: 2019-07-16
Payer: MEDICARE

## 2019-07-16 PROCEDURE — 97110 THERAPEUTIC EXERCISES: CPT

## 2019-07-16 PROCEDURE — 97530 THERAPEUTIC ACTIVITIES: CPT
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
## 2019-07-19 ENCOUNTER — HOSPITAL ENCOUNTER (OUTPATIENT)
Dept: PHYSICAL THERAPY | Age: 69
Discharge: HOME | End: 2019-07-19
Payer: MEDICARE

## 2019-07-19 PROCEDURE — 97530 THERAPEUTIC ACTIVITIES: CPT

## 2019-07-19 PROCEDURE — 97110 THERAPEUTIC EXERCISES: CPT
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
## 2019-07-23 ENCOUNTER — HOSPITAL ENCOUNTER (OUTPATIENT)
Dept: PHYSICAL THERAPY | Age: 69
Discharge: HOME | End: 2019-07-23
Payer: MEDICARE

## 2019-07-23 PROCEDURE — 97530 THERAPEUTIC ACTIVITIES: CPT

## 2019-07-23 PROCEDURE — 97110 THERAPEUTIC EXERCISES: CPT

## 2019-07-23 NOTE — PROGRESS NOTES
Andreea Carballo  : 1950  [place]  [place] [address]  [phone]   [phone]      OUTPATIENT PHYSICAL THERAPY: Daily Treatment Note  2019   ICD-10: Treatment Diagnosis:Muscle weakness (generalized) (M62.81)Other abnormalities of gait and mobility (R26.89       Effective Dates: 2019 TO 2019 (90 days). Frequency/Duration: 2 times a week for 90 Days  GOALS: (Goals have been discussed and agreed upon with patient.)  Short-Term Functional Goals: Time Frame: 4 weeks  1. Pt to report compliance with HEP.- MET  2. Pt to decreased back pain with activity. - MET  Discharge Goals: Time Frame: 12 weeks  1. Pt to demonstrate quad strength for repeated sit to stand without compensation. - ongoing  2. Pt to demonstrate hip strength for reciprocal pattern on stairs. - MET  3. Pt to demonstrate > 15 sec SLS for safe amb all surfaces - ongoing  _________________________________________________________________________  Pre-treatment Symptoms/Complaints: I am stiff this morning. Just the same ole same ole. I still can't balance but I try it. I hate that I have to leave for [place] when we are making such good progress. Pain: Initial: 0/10 Post Session:  0/10   Medications Last Reviewed:  2019    Updated Objective Findings:     Palpation:  Tender B greater trochanters;  Mild paraspinal tightness B    Strength:      4/5 hip ext, abd B        Functional Mobility:         Gait/Ambulation:  WNL        Stairs:  reciprocal  Balance:          R 20 sec, L 30 sec 19    Tool Used: Lower Extremity Functional Scale (LEFS)  Score:  Initial: 58/80 Most Recent: 48/80 (Date: 19 )   Interpretation of Score: 20 questions each scored on a 5 point scale with 0 representing \"extreme difficulty or unable to perform\" and 4 representing \"no difficulty\".   The lower the score, the greater the functional disability. 80/80 represents no disability. Minimal detectable change is 9 points. TREATMENT:   THERAPEUTIC ACTIVITY: ( see below for minutes): Therapeutic activities per grid below to improve mobility. Required moderate verbal and manual cues to improve functional mobility . THERAPEUTIC EXERCISE: (see below for minutes):  Exercises per grid below to improve strength. Required moderate verbal and manual cues to promote proper body alignment, promote proper body posture, promote proper body mechanics and promote proper body breathing techniques. Progressed resistance, range, repetitions and complexity of movement as indicated. MANUAL THERAPY: (see below for minutes): Joint mobilization and Soft tissue mobilization was utilized and necessary because of the patient's restricted joint motion, painful spasm, loss of articular motion and restricted motion of soft tissue.    MODALITIES: (see below for minutes):      for pain modulation       Date: 7/5/19  Visit 5 7/9/19  Visit 6 7/12/19  Visit 7 7/16/19  Visit 8  PN 7/19/19  Visit 9 7/23/19  Visit 10     Modalities:                      Therapeutic Exercise: 15 mins 15 mins 30 mins 10 mins 10 mins 15 mins     S/L hip abd B X 30 X 30 X 30        Clams B X 30 X 30 X 30        Calf raises X 30 X 30 X 30 X 30 X 30 X 30     LTR   X 10 X 10 X 10 X 10     Lower abdominal engagement   3 mins        John stretch B   2 mins each 2 mins each 2 mins ea      Piriformis stretch B      5 x 10 sec                Manual Therapy:   10 mins  10 mins      STM hip flex B   supine  supine                 Therapeutic Activities: 30 mins 30 mins 5 mins 30 mins 25 mins 30 mins     Pt education, postural education, HEP           Bridging without hams, paraspinals X 30 X 30 X 30  X 30 X 30     Sit to stand without UE support X 20 X 20 X 20 X 30 X 30 X 30     SLS B, holding pelvis level X 5 X 5  X 5 X 5  X 5     Tandem stance B X 2 twists floor and foam X 2 twists floor and foam  X 2 twists on floor and foam X 2 twists on floor and foam X 2 twists floor and foam     Step ups ant B 6 in x 20 6 in x 20  8 in x 20 8 in x 20 8 in x 30       HEP: see hand out    [employer] Portal  Treatment/Session Summary:    · Response to Treatment:    Pt has long standing tightness. Good response to stretching. R LE balance remains a challenge. Good effort. · Communication/Consultation:  None today  · Equipment provided today:  None today  · Recommendations/Intent for next treatment session: Next visit will focus on strengthening and balance as indicated.     Total Treatment Billable Duration:  45 mins  PT Patient Time In/Time Out  Time In: 0800  Time Out: 0845    Angelica Lucas, PT

## 2019-07-26 ENCOUNTER — HOSPITAL ENCOUNTER (OUTPATIENT)
Dept: PHYSICAL THERAPY | Age: 69
Discharge: HOME | End: 2019-07-26
Payer: MEDICARE

## 2019-07-26 PROCEDURE — 97530 THERAPEUTIC ACTIVITIES: CPT

## 2019-07-26 NOTE — PROGRESS NOTES
Ho Whiting  : 1950  [place]  [place]: [place] Maria E Ball [address] @ [address]  [phone]   [phone]      OUTPATIENT PHYSICAL THERAPY: Daily Treatment Note  2019   ICD-10: Treatment Diagnosis:Muscle weakness (generalized) (M62.81)Other abnormalities of gait and mobility (R26.89       Effective Dates: 2019 TO 2019 (90 days). Frequency/Duration: 2 times a week for 90 Days  GOALS: (Goals have been discussed and agreed upon with patient.)  Short-Term Functional Goals: Time Frame: 4 weeks  1. Pt to report compliance with HEP.- MET  2. Pt to decreased back pain with activity. - MET  Discharge Goals: Time Frame: 12 weeks  1. Pt to demonstrate quad strength for repeated sit to stand without compensation. - ongoing  2. Pt to demonstrate hip strength for reciprocal pattern on stairs. - MET  3. Pt to demonstrate > 15 sec SLS for safe amb all surfaces - ongoing  _________________________________________________________________________  Pre-treatment Symptoms/Complaints:  I fell last night walking to the concert. We went through the woods and my R ankle rolled. It is sore and the whole right leg is stiff and sore. I always land on my left side when I fall. I am so discouraged.    Pain: Initial: 0/10 Post Session:  0/10   Medications Last Reviewed:  2019    Updated Objective Findings:     Palpation:  Tender B greater trochanters;  Mild paraspinal tightness B    Strength:      4/5 hip ext, abd B        Functional Mobility:         Gait/Ambulation:  WNL        Stairs:  reciprocal  Balance:          R 20 sec, L 30 sec 19    Tool Used: Lower Extremity Functional Scale (LEFS)  Score:  Initial: 58/80 Most Recent: 4880 (Date: 19 )   Interpretation of Score: 20 questions each scored on a 5 point scale with 0 representing \"extreme difficulty or unable to perform\" and 4 representing \"no difficulty\". The lower the score, the greater the functional disability. 80/80 represents no disability. Minimal detectable change is 9 points. TREATMENT:   THERAPEUTIC ACTIVITY: ( see below for minutes): Therapeutic activities per grid below to improve mobility. Required moderate verbal and manual cues to improve functional mobility . THERAPEUTIC EXERCISE: (see below for minutes):  Exercises per grid below to improve strength. Required moderate verbal and manual cues to promote proper body alignment, promote proper body posture, promote proper body mechanics and promote proper body breathing techniques. Progressed resistance, range, repetitions and complexity of movement as indicated. MANUAL THERAPY: (see below for minutes): Joint mobilization and Soft tissue mobilization was utilized and necessary because of the patient's restricted joint motion, painful spasm, loss of articular motion and restricted motion of soft tissue.    MODALITIES: (see below for minutes):      for pain modulation       Date: 7/5/19  Visit 5 7/9/19  Visit 6 7/12/19  Visit 7 7/16/19  Visit 8  PN 7/19/19  Visit 9 7/23/19  Visit 10 7/26/19  Visit 11    Modalities:                      Therapeutic Exercise: 15 mins 15 mins 30 mins 10 mins 10 mins 15 mins     S/L hip abd B X 30 X 30 X 30        Clams B X 30 X 30 X 30        Calf raises X 30 X 30 X 30 X 30 X 30 X 30     LTR   X 10 X 10 X 10 X 10     Lower abdominal engagement   3 mins        John stretch B   2 mins each 2 mins each 2 mins ea      Piriformis stretch B      5 x 10 sec                Manual Therapy:   10 mins  10 mins      STM hip flex B   supine  supine                 Therapeutic Activities: 30 mins 30 mins 5 mins 30 mins 25 mins 30 mins 40 mins    Pt education, postural education, HEP       Went through new ex sheet for HEP with practice and options for tweaking while pt is away     Bridging without hams, paraspinals X 30 X 30 X 30  X 30 X 30 Sit to stand without UE support X 20 X 20 X 20 X 30 X 30 X 30     SLS B, holding pelvis level X 5 X 5  X 5 X 5  X 5     Tandem stance B X 2 twists floor and foam X 2 twists floor and foam  X 2 twists on floor and foam X 2 twists on floor and foam X 2 twists floor and foam     Step ups ant B 6 in x 20 6 in x 20  8 in x 20 8 in x 20 8 in x 30       HEP:  new hand out given inc all ex's as above     [employer] Portal  Treatment/Session Summary:    · Response to Treatment:   Pt to use hiking stick/high top hiking boot/ankle brace in the interim when she is walking on uneven surfaces. Pt has decreased awareness of ankle position with WB - possible neuropathy. She has made significant gains in strength and balance but has just started gaining more dynamic balance control. She will benefit from continued focus on balance both static and dynamic. Mild swelling and tenderness at R lat ankle - mild ankle sprain. · Communication/Consultation:  None today  · Equipment provided today:  None today  · Recommendations/Intent for next treatment session: Next visit will focus on strengthening and balance as indicated.     Total Treatment Billable Duration:  45 mins  PT Patient Time In/Time Out  Time In: 0800  Time Out: 0845    Teri Lucas, PT

## 2019-07-30 ENCOUNTER — HOSPITAL ENCOUNTER (OUTPATIENT)
Dept: PHYSICAL THERAPY | Age: 69
Discharge: HOME | End: 2019-07-30
Payer: MEDICARE

## 2019-07-30 PROCEDURE — 97110 THERAPEUTIC EXERCISES: CPT

## 2019-07-30 PROCEDURE — 97530 THERAPEUTIC ACTIVITIES: CPT

## 2019-07-30 NOTE — PROGRESS NOTES
Keon Cummings  : 1950  [place]  Secondary: Sohan Ball [address] @ [address] [place].  [phone]   [phone]      OUTPATIENT PHYSICAL THERAPY: Daily Treatment Note  2019   ICD-10: Treatment Diagnosis:Muscle weakness (generalized) (M62.81)Other abnormalities of gait and mobility (R26.89       Effective Dates: 2019 TO 2019 (90 days). Frequency/Duration: 2 times a week for 90 Days  GOALS: (Goals have been discussed and agreed upon with patient.)  Short-Term Functional Goals: Time Frame: 4 weeks  1. Pt to report compliance with HEP.- MET  2. Pt to decreased back pain with activity. - MET  Discharge Goals: Time Frame: 12 weeks  1. Pt to demonstrate quad strength for repeated sit to stand without compensation. - ongoing  2. Pt to demonstrate hip strength for reciprocal pattern on stairs. - MET  3. Pt to demonstrate > 15 sec SLS for safe amb all surfaces - ongoing  _________________________________________________________________________  Pre-treatment Symptoms/Complaints: The ankle is feeling better. The hips have been hurting, R > L. I feel a strange sensation on the outer lower leg too. Not numb or pain, just strange. Pain: Initial: 0/10 Post Session:  0/10   Medications Last Reviewed:  2019    Updated Objective Findings:     Palpation:  Tender B greater trochanters;  Mild paraspinal tightness B    Strength:      4/5 hip ext, abd B        Functional Mobility:         Gait/Ambulation:  WNL        Stairs:  reciprocal  Balance:          R 20 sec, L 30 sec 19    Tool Used: Lower Extremity Functional Scale (LEFS)  Score:  Initial: 58/80 Most Recent: 48/80 (Date: 19 )   Interpretation of Score: 20 questions each scored on a 5 point scale with 0 representing \"extreme difficulty or unable to perform\" and 4 representing \"no difficulty\".   The lower the score, the greater the functional disability. 80/80 represents no disability. Minimal detectable change is 9 points. TREATMENT:   THERAPEUTIC ACTIVITY: ( see below for minutes): Therapeutic activities per grid below to improve mobility. Required moderate verbal and manual cues to improve functional mobility . THERAPEUTIC EXERCISE: (see below for minutes):  Exercises per grid below to improve strength. Required moderate verbal and manual cues to promote proper body alignment, promote proper body posture, promote proper body mechanics and promote proper body breathing techniques. Progressed resistance, range, repetitions and complexity of movement as indicated. MANUAL THERAPY: (see below for minutes): Joint mobilization and Soft tissue mobilization was utilized and necessary because of the patient's restricted joint motion, painful spasm, loss of articular motion and restricted motion of soft tissue.    MODALITIES: (see below for minutes):      for pain modulation       Date: 7/12/19  Visit 7 7/16/19  Visit 8  PN 7/19/19  Visit 9 7/23/19  Visit 10 7/26/19  Visit 11 7/30/19  Visit 12     Modalities:                      Therapeutic Exercise: 30 mins 10 mins 10 mins 15 mins  10 mins     S/L hip abd B X 30          Clams B X 30          Calf raises X 30 X 30 X 30 X 30  X 30     LTR X 10 X 10 X 10 X 10  X 10     Lower abdominal engagement 3 mins          John stretch B 2 mins each 2 mins each 2 mins ea        Piriformis stretch B    5 x 10 sec  5 x 10 sec                Manual Therapy: 10 mins  10 mins        STM hip flex B supine  supine                   Therapeutic Activities: 5 mins 30 mins 25 mins 30 mins 40 mins 30 mins     Pt education, postural education, HEP     Went through new ex sheet for HEP with practice and options for tweaking while pt is away       Bridging without hams, paraspinals X 30  X 30 X 30       Sit to stand without UE support X 20 X 30 X 30 X 30       SLS B, holding pelvis level  X 5 X 5 X 5  X 5     Tandem stance B  X 2 twists on floor and foam X 2 twists on floor and foam X 2 twists floor and foam  X 2 twists floor     Step ups ant B  8 in x 20 8 in x 20 8 in x 30  8 in x 30     Lat stepping       4 laps       HEP:  new hand out given inc all ex's as above     [employer] Portal  Treatment/Session Summary:    · Response to Treatment:    Pt is having trochanteric bursitis issues. Encouraged to work glut strengthening. Balance nani and result much improved today vs last visit. Pt will be OOT until 8/10 because she has to take care of family visit in [place]. She will continue her HEP and contact me with any questions. Will resume POC when she returns. · Communication/Consultation:  None today  · Equipment provided today:  None today  · Recommendations/Intent for next treatment session: Next visit will focus on strengthening and balance as indicated.     Total Treatment Billable Duration:  40 mins  PT Patient Time In/Time Out  Time In: 0800  Time Out: 0845    Mohsen Lucas, PT

## 2019-09-10 ENCOUNTER — APPOINTMENT (OUTPATIENT)
Dept: PHYSICAL THERAPY | Age: 69
End: 2019-09-10

## 2019-09-13 ENCOUNTER — APPOINTMENT (OUTPATIENT)
Dept: PHYSICAL THERAPY | Age: 69
End: 2019-09-13

## 2019-09-17 ENCOUNTER — APPOINTMENT (OUTPATIENT)
Dept: PHYSICAL THERAPY | Age: 69
End: 2019-09-17

## 2019-09-17 NOTE — THERAPY DISCHARGE
Sula Apgar   (NND:) [id number] [place] @ [address] [place] [address].  [phone]   [phone]           Discontinuation summary    REFERRING PHYSICIAN: Akila Silva MD  Return Physician Appointment: Nov  MEDICAL/REFERRING DIAGNOSIS:  · Repeated falls [R29.6]  ATTENDANCE: Sula Apgar has attended 12 out of 12 visits, with 0 cancellation(s) and 0 no shows. ASSESSMENT:  DATE: 2019    PROGRESS: Sula Apgar was making good progress with strength and balance. She had to go to [place] to care for her sister the month of August but has not been able to return home at this time. She is continuing with her HEP. RECOMMENDATIONS: Pt is being discharged from active care at this time. She will request a new order when she returns and we will resume care at that time.     Thank you for this referral,    Ileana Lucas, PT

## 2019-09-20 ENCOUNTER — APPOINTMENT (OUTPATIENT)
Dept: PHYSICAL THERAPY | Age: 69
End: 2019-09-20

## 2019-09-24 ENCOUNTER — HOSPITAL ENCOUNTER (OUTPATIENT)
Dept: MAMMOGRAPHY | Age: 69
Discharge: HOME | End: 2019-09-24
Attending: INTERNAL MEDICINE
Payer: MEDICARE

## 2019-09-24 ENCOUNTER — APPOINTMENT (OUTPATIENT)
Dept: PHYSICAL THERAPY | Age: 69
End: 2019-09-24

## 2019-09-24 DIAGNOSIS — Z12.31 ENCOUNTER FOR SCREENING MAMMOGRAM FOR BREAST CANCER: ICD-10-CM

## 2019-09-24 PROCEDURE — 77067 SCR MAMMO BI INCL CAD: CPT

## 2019-09-27 ENCOUNTER — APPOINTMENT (OUTPATIENT)
Dept: PHYSICAL THERAPY | Age: 69
End: 2019-09-27

## 2019-10-21 ENCOUNTER — HOSPITAL ENCOUNTER (OUTPATIENT)
Dept: MRI IMAGING | Age: 69
Discharge: HOME | End: 2019-10-21
Attending: NURSE PRACTITIONER
Payer: MEDICARE

## 2019-10-21 DIAGNOSIS — Z91.81 HISTORY OF FALLING: ICD-10-CM

## 2019-10-21 PROCEDURE — 72141 MRI NECK SPINE W/O DYE: CPT

## 2019-10-21 PROCEDURE — 72148 MRI LUMBAR SPINE W/O DYE: CPT

## 2019-11-08 PROBLEM — M48.02 CERVICAL SPINAL STENOSIS: Status: ACTIVE | Noted: 2019-11-08

## 2019-11-08 PROBLEM — M47.816 OSTEOARTHRITIS OF LUMBAR SPINE: Status: ACTIVE | Noted: 2019-11-08

## 2019-12-03 PROBLEM — M48.061 LUMBAR SPINAL STENOSIS: Status: ACTIVE | Noted: 2019-12-03

## 2021-06-02 ENCOUNTER — TRANSCRIBE ORDER (OUTPATIENT)
Dept: SCHEDULING | Age: 71
End: 2021-06-02

## 2021-06-02 DIAGNOSIS — M54.2 NONSPECIFIC PAIN IN THE NECK REGION: Primary | ICD-10-CM

## 2021-06-02 DIAGNOSIS — Z78.0 POSTMENOPAUSAL STATUS: Primary | ICD-10-CM

## 2021-06-02 DIAGNOSIS — M48.02 CERVICAL SPINAL STENOSIS: ICD-10-CM

## 2021-06-02 DIAGNOSIS — Z13.820 SCREENING FOR OSTEOPOROSIS: ICD-10-CM

## 2021-06-02 DIAGNOSIS — Z12.31 ENCOUNTER FOR SCREENING MAMMOGRAM FOR MALIGNANT NEOPLASM OF BREAST: Primary | ICD-10-CM

## 2021-06-08 ENCOUNTER — TRANSCRIBE ORDER (OUTPATIENT)
Dept: SCHEDULING | Age: 71
End: 2021-06-08

## 2021-06-08 DIAGNOSIS — Z12.31 VISIT FOR SCREENING MAMMOGRAM: Primary | ICD-10-CM

## 2021-06-18 ENCOUNTER — HOSPITAL ENCOUNTER (OUTPATIENT)
Dept: MRI IMAGING | Age: 71
Discharge: HOME | End: 2021-06-18
Attending: INTERNAL MEDICINE
Payer: MEDICARE

## 2021-06-18 ENCOUNTER — TRANSCRIBE ORDER (OUTPATIENT)
Dept: SCHEDULING | Age: 71
End: 2021-06-18

## 2021-06-18 DIAGNOSIS — M48.02 CERVICAL SPINAL STENOSIS: ICD-10-CM

## 2021-06-18 DIAGNOSIS — M54.2 NONSPECIFIC PAIN IN THE NECK REGION: ICD-10-CM

## 2021-06-18 DIAGNOSIS — R13.19 OTHER DYSPHAGIA: Primary | ICD-10-CM

## 2021-06-18 PROCEDURE — 72141 MRI NECK SPINE W/O DYE: CPT

## 2021-08-02 ENCOUNTER — HOSPITAL ENCOUNTER (OUTPATIENT)
Dept: MAMMOGRAPHY | Age: 71
Discharge: HOME | End: 2021-08-02
Attending: INTERNAL MEDICINE
Payer: MEDICARE

## 2021-08-02 DIAGNOSIS — Z78.0 POSTMENOPAUSAL STATUS: ICD-10-CM

## 2021-08-02 DIAGNOSIS — Z13.820 SCREENING FOR OSTEOPOROSIS: ICD-10-CM

## 2021-08-02 DIAGNOSIS — Z12.31 VISIT FOR SCREENING MAMMOGRAM: ICD-10-CM

## 2021-08-02 PROCEDURE — 77063 BREAST TOMOSYNTHESIS BI: CPT

## 2021-08-02 PROCEDURE — 77080 DXA BONE DENSITY AXIAL: CPT

## 2021-08-03 ENCOUNTER — HOSPITAL ENCOUNTER (OUTPATIENT)
Dept: GENERAL RADIOLOGY | Age: 71
Discharge: HOME | End: 2021-08-03
Attending: PHYSICIAN ASSISTANT
Payer: MEDICARE

## 2021-08-03 DIAGNOSIS — R13.19 OTHER DYSPHAGIA: ICD-10-CM

## 2021-08-03 PROCEDURE — 74011000250 HC RX REV CODE- 250: Performed by: PHYSICIAN ASSISTANT

## 2021-08-03 PROCEDURE — 74220 X-RAY XM ESOPHAGUS 1CNTRST: CPT

## 2021-08-03 RX ADMIN — BARIUM SULFATE 700 MG: 700 TABLET ORAL at 10:45

## 2021-08-03 RX ADMIN — BARIUM SULFATE 355 ML: 0.6 SUSPENSION ORAL at 10:45

## 2022-03-15 NOTE — HPI: SKIN LESION (SKIN TAGS)
Natasha Gentile 53 y o  male MRN: [id number]    Encounter: [id number]      Assessment/Plan     Assessment: This is a 53y o -year-old male with type 1 diabetes with retinopathy on insulin pump therapy and hyperlipidemia  Plan:  1  Type 1 diabetes with insulin pump status:  His hemoglobin A1c is slightly improved to 7 6  However, his pump and sensor download appears to be improved with only mild hyperglycemia in the afternoon and evening  For this I have adjusted his basal rate at noon to 1 9 and at 10:00 p m  to 1 6   I have asked him to continue a proper diabetic diet and perform a download through [address] [place] [address] in 2 weeks for review  We will contact him with any changes based upon the data from his upcoming report  Check hemoglobin A1c prior to next visit      2  Peripheral neuropathy:  He continues with intermittent bilateral foot pain   Continue gabapentin  He is continuing to follow-up with vascular   Follow up with Podiatry for regular diabetic foot care       3  Hyperlipidemia: Continue current regimen   Check fasting lipid panel prior to next visit      4   Hypertension:  His blood pressure is reasonable in the office today  Dat [facility] comprehensive metabolic panel prior to next visit  5   Longstanding nausea:  He has had issues with nausea in the morning along with a cough and "mucus  "This has also affected his appetite  ENT follow-up and exam was unremarkable  He will follow-up with Gastroenterology for further evaluation and treatment  CC:  Type 1 Diabetes follow-up    History of Present Illness     HPI:  53 y  o  year old male with type 1 diabetes for over 20 years   He is on insulin at home and takes Novolog via Medtronic 670 g insulin pump   Most recent hemoglobin A1c from February 11, 2022 is 7  6    He denies any polyuria, polydipsia, nocturia and blurry vision   He denies nephropathy but does admit to neuropathy and retinopathy   His Medtronic 670 G insulin pump settings
are as follows:          Current settings:  Basal rate  ? 0000 - 1 60 units/hr  ? 0800 - 2 20 units/hr  ? 1200 - 1 80 units/hr  ? 2200 - 1 50 units/hr  Carb ratios  ? 0:00 - 3 0  ? 12:00 - 2 0  ? 18:00 - 1 8  Insulin sensitivity 30  BG target 130       Hypoglycemic episodes: Rare, utilizes CGM        Follows with Dr Isabela Mitchell for eye care regularly  Daisy Santiago visit was in December 2020  The patient's last foot exam was performed at last office visit on November 11, 2021      Blood Sugar/Glucometer/Pump/CGM review:  Insulin pump download from March 2 through March 15, 2022  reveals an average sensor glucose of 149 with a standard deviation of  40  Auto mode 84% of the time   Total daily dose is 66 0 units  55% is basal      For hypertension, he is treated with losartan 50 mg daily       For hyperlipidemia continues on simvastatin 40 mg daily         Review of Systems   Constitutional: Positive for fatigue  Negative for chills and fever  HENT: Negative  Negative for trouble swallowing and voice change  Eyes: Negative for photophobia, pain, discharge, redness, itching and visual disturbance  Respiratory: Positive for cough ( every morning with productive mucus)  Negative for shortness of breath  Cardiovascular: Negative for chest pain and palpitations  Gastrointestinal: Positive for nausea (Every morning and causing change in appetite)  Negative for abdominal pain, constipation, diarrhea and vomiting  Endocrine: Positive for polyuria (Once per night nocturia)  Negative for cold intolerance, heat intolerance, polydipsia and polyphagia  Genitourinary: Negative  Musculoskeletal: Negative  Skin: Negative  Allergic/Immunologic: Negative  Neurological: Positive for numbness ( in feet bilaterally)  Negative for dizziness, syncope, light-headedness and headaches  Hematological: Negative  Psychiatric/Behavioral: Negative  All other systems reviewed and are negative        Historical Information
History reviewed  No pertinent past medical history  History reviewed  No pertinent surgical history  Social History   Social History     Substance and Sexual Activity   Alcohol Use Yes    Comment: 1 drink daily     Social History     Substance and Sexual Activity   Drug Use No     Social History     Tobacco Use   Smoking Status Former Smoker    Packs/day: 0 50    Years: 30 00    Pack years: 15 00    Types: Cigarettes    Quit date: 2012    Years since quittin 2   Smokeless Tobacco Former User     Family History:   Family History   Problem Relation Age of Onset    No Known Problems Mother     Hyperlipidemia Father     Hypertension Father     Irritable bowel syndrome Father     No Known Problems Brother        Meds/Allergies   Current Outpatient Medications   Medication Sig Dispense Refill    Accu-Chek Guide test strip USE TO TEST 5 TIMES DAILY 500 strip 5    buPROPion (WELLBUTRIN XL) 300 mg 24 hr tablet Take 300 mg by mouth daily  3    DYMISTA 137-50 MCG/ACT SUSP SPRAY 1 SPRAY IN EACH NOSTRIL BY INTRANASAL ROUTE 2 TIMES PER DAY  5    gabapentin (NEURONTIN) 300 mg capsule Take 2 capsules (600 mg total) by mouth 2 (two) times a day 360 capsule 1    loratadine (CLARITIN) 10 mg tablet Take 10 mg by mouth daily      losartan (COZAAR) 50 mg tablet Take 1 tablet (50 mg total) by mouth daily 90 tablet 3    NovoLOG 100 UNIT/ML injection USE UP  UNITS DAILY VIA INSULIN PUMP 90 mL 0    simvastatin (ZOCOR) 40 mg tablet TAKE 1 TABLET (40 MG TOTAL) BY MOUTH DAILY AT BEDTIME 90 tablet 3     No current facility-administered medications for this visit  Allergies   Allergen Reactions    Grass Extracts [Gramineae Pollens]     Tree Extract        Objective   Vitals: Blood pressure 134/84, pulse 103, height 5' 11" (1 803 m), weight 101 kg (222 lb), SpO2 97 %  Physical Exam  Vitals reviewed  Constitutional:       Appearance: He is well-developed     HENT:      Head: Normocephalic and
How Severe Are They?: mild
atraumatic  Nose: Nose normal    Eyes:      Conjunctiva/sclera: Conjunctivae normal       Pupils: Pupils are equal, round, and reactive to light  Cardiovascular:      Rate and Rhythm: Normal rate and regular rhythm  Heart sounds: Normal heart sounds  Pulmonary:      Effort: Pulmonary effort is normal       Breath sounds: Normal breath sounds  Abdominal:      General: Bowel sounds are normal       Palpations: Abdomen is soft  Musculoskeletal:         General: Normal range of motion  Cervical back: Normal range of motion and neck supple  Skin:     General: Skin is warm and dry  Neurological:      Mental Status: He is alert and oriented to person, place, and time  Psychiatric:         Behavior: Behavior normal          Thought Content:  Thought content normal          Judgment: Judgment normal        Lab Results:   Lab Results   Component Value Date/Time    Hemoglobin A1C 7 6 (H) 02/11/2022 09:49 AM    Hemoglobin A1C 7 9 (H) 11/01/2021 10:29 AM    Hemoglobin A1C 7 4 (H) 07/29/2021 10:37 AM    White Blood Cell Count 7 6 02/11/2022 09:49 AM    Hemoglobin 14 4 02/11/2022 09:49 AM    HCT 40 9 02/11/2022 09:49 AM    MCV 96 02/11/2022 09:49 AM    Platelet Count 092 50/62/9765 09:49 AM    BUN 8 02/11/2022 09:49 AM    BUN 11 11/01/2021 10:29 AM    BUN 7 07/29/2021 10:37 AM    Potassium 5 2 02/11/2022 09:49 AM    Potassium 4 8 11/01/2021 10:29 AM    Potassium 4 5 07/29/2021 10:37 AM    Chloride 95 (L) 02/11/2022 09:49 AM    Chloride 98 11/01/2021 10:29 AM    Chloride 99 07/29/2021 10:37 AM    CO2 22 02/11/2022 09:49 AM    CO2 25 11/01/2021 10:29 AM    CO2 23 07/29/2021 10:37 AM    Creatinine 0 92 02/11/2022 09:49 AM    Creatinine 0 89 11/01/2021 10:29 AM    Creatinine 0 88 07/29/2021 10:37 AM    AST 48 (H) 02/11/2022 09:49 AM    AST 48 (H) 11/01/2021 10:29 AM    AST 45 (H) 07/29/2021 10:37 AM    ALT 37 02/11/2022 09:49 AM    ALT 38 11/01/2021 10:29 AM    ALT 46 (H) 07/29/2021 10:37 AM    Albumin 4 3
02/11/2022 09:49 AM    Albumin 4 3 11/01/2021 10:29 AM    Albumin 4 3 07/29/2021 10:37 AM    Globulin, Total 2 8 02/11/2022 09:49 AM    Globulin, Total 2 7 11/01/2021 10:29 AM    Globulin, Total 2 8 07/29/2021 10:37 AM    HDL 94 11/01/2021 10:29 AM    HDL 68 04/17/2021 09:18 AM    Triglycerides 58 11/01/2021 10:29 AM    Triglycerides 114 04/17/2021 09:18 AM       Portions of the record may have been created with voice recognition software  Occasional wrong word or "sound a like" substitutions may have occurred due to the inherent limitations of voice recognition software  Read the chart carefully and recognize, using context, where substitutions have occurred 
Is This A New Presentation, Or A Follow-Up?: Skin Lesion

## 2022-03-18 PROBLEM — E66.01 SEVERE OBESITY (HCC): Status: ACTIVE | Noted: 2019-06-06

## 2022-03-18 PROBLEM — K21.9 GASTROESOPHAGEAL REFLUX DISEASE: Status: ACTIVE | Noted: 2017-02-15

## 2022-03-19 PROBLEM — M48.061 LUMBAR SPINAL STENOSIS: Status: ACTIVE | Noted: 2019-12-03

## 2022-03-19 PROBLEM — K57.90 DIVERTICULOSIS: Status: ACTIVE | Noted: 2019-07-11

## 2022-03-19 PROBLEM — Z91.81 HISTORY OF FALLING: Status: ACTIVE | Noted: 2019-07-11

## 2022-03-19 PROBLEM — M48.02 CERVICAL SPINAL STENOSIS: Status: ACTIVE | Noted: 2019-11-08

## 2022-03-19 PROBLEM — M47.816 OSTEOARTHRITIS OF LUMBAR SPINE: Status: ACTIVE | Noted: 2019-11-08

## 2022-03-19 PROBLEM — M53.9 DISORDER OF THORACIC SPINE: Status: ACTIVE | Noted: 2019-07-11

## 2022-03-20 PROBLEM — M17.0 OSTEOARTHRITIS OF BOTH KNEES: Status: ACTIVE | Noted: 2019-07-11

## 2023-07-25 NOTE — PROGRESS NOTES
These results are overall reassuring. There is no evidence of stroke or masses. The nonspecific findings noted are common in patients age >57 and not likely contributing to any of your current symptoms/problems.
No

## 2024-02-28 ENCOUNTER — HOSPITAL ENCOUNTER (OUTPATIENT)
Dept: PHYSICAL THERAPY | Age: 74
Setting detail: RECURRING SERIES
Discharge: HOME | End: 2024-03-02
Payer: MEDICARE

## 2024-02-28 DIAGNOSIS — M25.652 STIFFNESS OF HIP JOINT, LEFT: ICD-10-CM

## 2024-02-28 DIAGNOSIS — M25.651 STIFFNESS OF HIP JOINT, RIGHT: ICD-10-CM

## 2024-02-28 DIAGNOSIS — R26.89 OTHER ABNORMALITIES OF GAIT AND MOBILITY: ICD-10-CM

## 2024-02-28 DIAGNOSIS — M54.59 OTHER LOW BACK PAIN: Primary | ICD-10-CM

## 2024-02-28 DIAGNOSIS — M62.81 MUSCLE WEAKNESS (GENERALIZED): ICD-10-CM

## 2024-02-28 DIAGNOSIS — M25.552 LEFT HIP PAIN: ICD-10-CM

## 2024-02-28 DIAGNOSIS — R29.6 REPEATED FALLS: ICD-10-CM

## 2024-02-28 PROCEDURE — 97110 THERAPEUTIC EXERCISES: CPT

## 2024-02-28 PROCEDURE — 97162 PT EVAL MOD COMPLEX 30 MIN: CPT

## 2024-02-28 ASSESSMENT — PAIN SCALES - GENERAL: PAINLEVEL_OUTOF10: 2

## 2024-02-28 NOTE — THERAPY EVALUATION
Jennifer Craft  : 1950  Primary: Medicare Part A And B (Medicare)  [facility] [place] [address] [address] [facility] [address]  Phone: [phone]  Fax: [phone] Plan Frequency: 1-2 times/week    Plan of Care/Certification Expiration Date: 24        Plan of Care/Certification Expiration Date:  Plan of Care/Certification Expiration Date: 24    Frequency/Duration: Plan Frequency: 1-2 times/week      Time In/Out:   Time In: 415  Time Out: 545      PT Visit Info:         Visit Count:  1                OUTPATIENT PHYSICAL THERAPY:             Initial Assessment 2024               Episode (LBP, balance issues)         Treatment Diagnosis:    Other low back pain  Other abnormalities of gait and mobility  Muscle weakness (generalized)  Repeated falls  Left hip pain  Stiffness of hip joint, left  Stiffness of hip joint, right  Medical/Referring Diagnosis:    Chronic left-sided low back pain without sciatica [M54.50, G89.29]    Referring Physician:  Adriana Francis MD MD Orders:  PT Eval and Treat   Return MD Appt:  Oct  Date of Onset:  Onset Date: 05/15/23     Allergies:  Meperidine  Restrictions/Precautions:    None      Medications Last Reviewed:  2024     SUBJECTIVE   History of Injury/Illness (Reason for Referral):  Pt reports several falls over the last few years.  Nothing triggers the falls.  Always falls forward.  Does have balance checks in the house.  Most falls are outside.  Cervical and lumbar stenosis.  Was walking more than she had been.  L LBP with lifting/carrying.   Always feels it.  Had a diverticulitis episode last May and the back pain has been there since.    Patient Stated Goal(s):  \"muscle control to avoid falls\"  Initial Pain Level:      210   Post Session Pain Level:     210  Past Medical History/Comorbidities:   Ms. Craft  has a past medical history of Cervical spinal stenosis, Depression,

## 2024-02-28 NOTE — PROGRESS NOTES
Jennifer Craft  : 1950  Primary: Medicare Part A And B (Medicare)  Secondary: BRYCE AMILCAR [facility] @ [place] [address] [address] [place] [address]  Phone: [phone]  Fax: [phone] Plan Frequency: 1-2 times/week    Plan of Care/Certification Expiration Date: 24        Plan of Care/Certification Expiration Date:  Plan of Care/Certification Expiration Date: 24    Frequency/Duration:   Plan Frequency: 1-2 times/week      Time In/Out:   Time In: 415  Time Out: 545      PT Visit Info:         Visit Count:  1    OUTPATIENT PHYSICAL THERAPY:   Treatment Note 2024       Episode  (LBP, balance issues)               Treatment Diagnosis:    Other low back pain  Other abnormalities of gait and mobility  Muscle weakness (generalized)  Repeated falls  Left hip pain  Stiffness of hip joint, left  Stiffness of hip joint, right    Goals: (Goals have been discussed and agreed upon with patient.)  Short-Term Functional Goals: Time Frame: 4 weeks  Pt to report compliance with HEP  Pt to demonstrate fluid movement patterns  Pt to increase strength with R push off without pain to normalize gait   Discharge Goals: Time Frame: 8 weeks  Pt to increase strength for sit to stand x 30 reps without UE assist  Pt to increase strength for reciprocal gait on stairs  Pt to increase SLS > 20 sec B for safe amb all surfaces  Pt to report min to no LBP with activity    Medical/Referring Diagnosis:    Chronic left-sided low back pain without sciatica [M54.50, G89.29]    Referring Physician:  Adriana Francis MD MD Orders:  PT Eval and Treat   Return MD Appt:  Oct   Date of Onset:  Onset Date: 05/15/23     Allergies:   Meperidine  Restrictions/Precautions:   None      Interventions Planned (Treatment may consist of any combination of the following):   Balance Training, Endurance Training, Functional Mobility Training, Home Exercise Program (HEP), Manual Therapy, Pain Management,

## 2024-03-04 ENCOUNTER — HOSPITAL ENCOUNTER (OUTPATIENT)
Dept: PHYSICAL THERAPY | Age: 74
Setting detail: RECURRING SERIES
Discharge: HOME | End: 2024-03-07
Payer: MEDICARE

## 2024-03-04 PROCEDURE — 97110 THERAPEUTIC EXERCISES: CPT

## 2024-03-04 NOTE — PROGRESS NOTES
articular motion and restricted motion of soft tissue.   MODALITIES: (see below for minutes):      for pain modulation    AQUATIC THERAPY (see below for minutes): Aquatic treatment performed per flow grid for Decreased muscle strength, Decreased endurance, Decreased static/dynamic balance and reactive control, Decreased activity endurance, Decompression, Ease of movement and Low impact and reduced weight bearing activity.                                          Date: 2/28/24 3/4/24  Visit 2      Modalities:                Manual Therapy:                Aquatics Activities:                Therapeutic Exercises: 40 mins 48 mins      Pt education, postural education, HEP, functional breathing 30 mins       Seated trunk flex stretch X 5 X 5      LTR  X 10      Piriformis stretch B seated 3 x 15 sec      Seated calf raise R X 10 X 10      Sit to stand X 5 X 15      Up on two, down on one  X 10      SLS B  CGA x 3      HEP: see hand out       Treatment/Session Summary:    Treatment Assessment: Ant hip pain/tightness with piriformis stretching.  Mildly pos FADIR R > L.  Pt is going OOT.  Will resume POC on 3/20.  Communication/Consultation:  None today  Equipment provided today:  None  Recommendations/Intent for next treatment session: Next visit will focus on stretching, strengthening, manual techniques as indicated.    Total Treatment Billable Duration:  48 minutes   Time In: 0130  Time Out: 0218    MARIAH MARRERO, PT         Charge Capture  [employer] Portal  Appt Desk     Future Appointments   Date Time Provider Department Center   3/6/2024  1:30 PM Mariah Marrero, PT [facility]   3/20/2024  1:30 PM Mariah Marrero, PT [facility]   3/27/2024  1:30 PM Mariah Marrero PT [facility]   4/1/2024  1:30 PM Mariah Marrero, PT [facility]   4/10/2024  1:30 PM Mariah Marrero, PT [facility]   4/17/2024  2:15 PM Mariah Marrero, PT [facility]

## 2024-03-06 ENCOUNTER — HOSPITAL ENCOUNTER (OUTPATIENT)
Dept: PHYSICAL THERAPY | Age: 74
Setting detail: RECURRING SERIES
End: 2024-03-06
Payer: MEDICARE

## 2024-03-11 ENCOUNTER — APPOINTMENT (OUTPATIENT)
Dept: PHYSICAL THERAPY | Age: 74
End: 2024-03-11
Payer: MEDICARE

## 2024-03-13 ENCOUNTER — APPOINTMENT (OUTPATIENT)
Dept: PHYSICAL THERAPY | Age: 74
End: 2024-03-13
Payer: MEDICARE

## 2024-03-18 ENCOUNTER — APPOINTMENT (OUTPATIENT)
Dept: PHYSICAL THERAPY | Age: 74
End: 2024-03-18
Payer: MEDICARE

## 2024-03-20 ENCOUNTER — HOSPITAL ENCOUNTER (OUTPATIENT)
Dept: PHYSICAL THERAPY | Age: 74
Setting detail: RECURRING SERIES
Discharge: HOME | End: 2024-03-23
Payer: MEDICARE

## 2024-03-20 PROCEDURE — 97110 THERAPEUTIC EXERCISES: CPT

## 2024-03-20 NOTE — PROGRESS NOTES
Jennifer Craft  : 1950  Primary: Medicare Part A And B (Medicare)  Secondary: BRYCE AMILCAR [facility] @ [place] [address] [address] [place] [address]  Phone: [phone]  Fax: [phone] Plan Frequency: 1-2 times/week    Plan of Care/Certification Expiration Date: 24        Plan of Care/Certification Expiration Date:  Plan of Care/Certification Expiration Date: 24    Frequency/Duration:   Plan Frequency: 1-2 times/week      Time In/Out:   Time In: 0130  Time Out: 0230      PT Visit Info:         Visit Count:  3    OUTPATIENT PHYSICAL THERAPY:   Treatment Note 3/20/2024       Episode  (LBP, balance issues)               Treatment Diagnosis:    Other low back pain  Other abnormalities of gait and mobility  Muscle weakness (generalized)  Repeated falls  Left hip pain  Stiffness of hip joint, left  Stiffness of hip joint, right    Goals: (Goals have been discussed and agreed upon with patient.)  Short-Term Functional Goals: Time Frame: 4 weeks  Pt to report compliance with HEP  Pt to demonstrate fluid movement patterns  Pt to increase strength with R push off without pain to normalize gait   Discharge Goals: Time Frame: 8 weeks  Pt to increase strength for sit to stand x 30 reps without UE assist  Pt to increase strength for reciprocal gait on stairs  Pt to increase SLS > 20 sec B for safe amb all surfaces  Pt to report min to no LBP with activity    Medical/Referring Diagnosis:    Chronic left-sided low back pain without sciatica [M54.50, G89.29]    Referring Physician:  Adriana Francis MD MD Orders:  PT Eval and Treat   Return MD Appt:  Oct   Date of Onset:  Onset Date: 05/15/23     Allergies:   Meperidine  Restrictions/Precautions:   None      Interventions Planned (Treatment may consist of any combination of the following):   Balance Training, Endurance Training, Functional Mobility Training, Home Exercise Program (HEP), Manual Therapy, Pain Management,

## 2024-03-25 ENCOUNTER — APPOINTMENT (OUTPATIENT)
Dept: PHYSICAL THERAPY | Age: 74
End: 2024-03-25
Payer: MEDICARE

## 2024-03-27 ENCOUNTER — HOSPITAL ENCOUNTER (OUTPATIENT)
Dept: PHYSICAL THERAPY | Age: 74
Setting detail: RECURRING SERIES
Discharge: HOME | End: 2024-03-30
Payer: MEDICARE

## 2024-03-27 PROCEDURE — 97110 THERAPEUTIC EXERCISES: CPT

## 2024-03-27 NOTE — PROGRESS NOTES
Jennifer Craft  : 1950  Primary: Medicare Part A And B (Medicare)  Secondary: BRYCE AMILCAR [facility] @ [place] [address] [address] [place] [address]  Phone: [phone]  Fax: [phone] Plan Frequency: 1-2 times/week    Plan of Care/Certification Expiration Date: 24        Plan of Care/Certification Expiration Date:  Plan of Care/Certification Expiration Date: 24    Frequency/Duration:   Plan Frequency: 1-2 times/week      Time In/Out:   Time In: 0132  Time Out: 0214      PT Visit Info:         Visit Count:  4    OUTPATIENT PHYSICAL THERAPY:   Treatment Note 3/27/2024       Episode  (LBP, balance issues)               Treatment Diagnosis:    Other low back pain  Other abnormalities of gait and mobility  Muscle weakness (generalized)  Repeated falls  Left hip pain  Stiffness of hip joint, left  Stiffness of hip joint, right    Goals: (Goals have been discussed and agreed upon with patient.)  Short-Term Functional Goals: Time Frame: 4 weeks  Pt to report compliance with HEP  Pt to demonstrate fluid movement patterns  Pt to increase strength with R push off without pain to normalize gait   Discharge Goals: Time Frame: 8 weeks  Pt to increase strength for sit to stand x 30 reps without UE assist  Pt to increase strength for reciprocal gait on stairs  Pt to increase SLS > 20 sec B for safe amb all surfaces  Pt to report min to no LBP with activity    Medical/Referring Diagnosis:    Chronic left-sided low back pain without sciatica [M54.50, G89.29]    Referring Physician:  Adriana Francis MD MD Orders:  PT Eval and Treat   Return MD Appt:  Oct   Date of Onset:  Onset Date: 05/15/23     Allergies:   Meperidine  Restrictions/Precautions:   None      Interventions Planned (Treatment may consist of any combination of the following):   Balance Training, Endurance Training, Functional Mobility Training, Home Exercise Program (HEP), Manual Therapy, Pain Management,

## 2024-04-01 ENCOUNTER — HOSPITAL ENCOUNTER (OUTPATIENT)
Dept: PHYSICAL THERAPY | Age: 74
Setting detail: RECURRING SERIES
Discharge: HOME | End: 2024-04-04
Payer: MEDICARE

## 2024-04-01 ENCOUNTER — APPOINTMENT (OUTPATIENT)
Dept: PHYSICAL THERAPY | Age: 74
End: 2024-04-01
Payer: MEDICARE

## 2024-04-01 PROCEDURE — 97110 THERAPEUTIC EXERCISES: CPT

## 2024-04-01 PROCEDURE — 97140 MANUAL THERAPY 1/> REGIONS: CPT

## 2024-04-01 NOTE — PROGRESS NOTES
restricted motion of soft tissue.   MODALITIES: (see below for minutes):      for pain modulation    AQUATIC THERAPY (see below for minutes): Aquatic treatment performed per flow grid for Decreased muscle strength, Decreased endurance, Decreased static/dynamic balance and reactive control, Decreased activity endurance, Decompression, Ease of movement and Low impact and reduced weight bearing activity.                                          Date: 3/20/24  Visit 3 3/27/24  Visit 4 4/1/24  Visit 5  PN   Modalities:            Manual Therapy:   13 mins   STM R paraspinals   S/L         Aquatics Activities:            Therapeutic Exercises: 40 mins 42 mins 40 mins   Pt education, postural education, HEP, functional breathing review     Seated trunk flex stretch X 5 X 5 X 5   Piriformis stretch B 3 x 15 sec 3 x 15 sec  passively 3 x 15 sec   Sit to stand  X 20 X 25   Up on two, down on one  X 15 X 15   Toe stepping B  X 10 X 10   SLS B  X 3  X 3   Tandem stance B  X 1 X 1   HEP: see hand out     Treatment/Session Summary:    Treatment Assessment:  Pt has improved overall.  Oswestry score has increased but pt had some confusion with balance, knee pain when answering.  Continue 2 more visits before pt will be OOT again.   Communication/Consultation:  None today  Equipment provided today:  None  Recommendations/Intent for next treatment session: Next visit will focus on stretching, strengthening, manual techniques as indicated.    Total Treatment Billable Duration:  53 minutes   Time In: 0130  Time Out: 0225    MARIAH MARRERO PT         Charge Capture  [email or web address] Portal  Appt Desk     Future Appointments   Date Time Provider Department Center   4/10/2024  1:30 PM Mariah Marrero PT [facility]   4/17/2024  2:15 PM Mariah Marrero PT [facility]

## 2024-04-10 ENCOUNTER — HOSPITAL ENCOUNTER (OUTPATIENT)
Dept: PHYSICAL THERAPY | Age: 74
Setting detail: RECURRING SERIES
Discharge: HOME | End: 2024-04-13
Payer: MEDICARE

## 2024-04-10 PROCEDURE — 97110 THERAPEUTIC EXERCISES: CPT

## 2024-04-10 NOTE — PROGRESS NOTES
Jennifer Craft  : 1950  Primary: Medicare Part A And B (Medicare)  Secondary: ANTHJAK AMILCAR [facility] @ [place] [address] [address] [place] [address]  Phone: [phone]  Fax: [phone] Plan Frequency: 1-2 times/week    Plan of Care/Certification Expiration Date: 24        Plan of Care/Certification Expiration Date:  Plan of Care/Certification Expiration Date: 24    Frequency/Duration:   Plan Frequency: 1-2 times/week      Time In/Out:   Time In: 0130  Time Out: 0218      PT Visit Info:         Visit Count:  6    OUTPATIENT PHYSICAL THERAPY:   Treatment Note 4/10/2024       Episode  (LBP, balance issues)               Treatment Diagnosis:    Other low back pain  Other abnormalities of gait and mobility  Muscle weakness (generalized)  Repeated falls  Left hip pain  Stiffness of hip joint, left  Stiffness of hip joint, right    Goals: (Goals have been discussed and agreed upon with patient.)  Short-Term Functional Goals: Time Frame: 4 weeks  Pt to report compliance with HEP - MET  Pt to demonstrate fluid movement patterns - MET  Pt to increase strength with R push off without pain to normalize gait  - ongoing  Discharge Goals: Time Frame: 8 weeks  Pt to increase strength for sit to stand x 30 reps without UE assist - ongoing  Pt to increase strength for reciprocal gait on stairs - ongoing  Pt to increase SLS > 20 sec B for safe amb all surfaces - ongoing  Pt to report min to no LBP with activity - ongoing    Medical/Referring Diagnosis:    Chronic left-sided low back pain without sciatica [M54.50, G89.29]    Referring Physician:  Adriana Francis MD MD Orders:  PT Eval and Treat   Return MD Appt:  Oct   Date of Onset:  Onset Date: 05/15/23     Allergies:   Meperidine  Restrictions/Precautions:   None      Interventions Planned (Treatment may consist of any combination of the following):   Balance Training, Endurance Training, Functional Mobility Training,

## 2024-04-17 ENCOUNTER — HOSPITAL ENCOUNTER (OUTPATIENT)
Dept: PHYSICAL THERAPY | Age: 74
Setting detail: RECURRING SERIES
Discharge: HOME | End: 2024-04-20
Payer: MEDICARE

## 2024-04-17 PROCEDURE — 97140 MANUAL THERAPY 1/> REGIONS: CPT

## 2024-04-17 PROCEDURE — 97110 THERAPEUTIC EXERCISES: CPT

## 2024-04-17 NOTE — PROGRESS NOTES
Jennifer Craft  : 1950  Primary: Medicare Part A And B (Medicare)  Secondary: ANTHJAK AMILCAR [facility] @ [place] [address] [address] [place] [address]  Phone: [phone]  Fax: [phone] Plan Frequency: 1-2 times/week    Plan of Care/Certification Expiration Date: 24        Plan of Care/Certification Expiration Date:  Plan of Care/Certification Expiration Date: 24    Frequency/Duration:   Plan Frequency: 1-2 times/week      Time In/Out:   Time In: 0228  Time Out: 030      PT Visit Info:         Visit Count:  7    OUTPATIENT PHYSICAL THERAPY:   Treatment Note 2024       Episode  (LBP, balance issues)               Treatment Diagnosis:    Other low back pain  Other abnormalities of gait and mobility  Muscle weakness (generalized)  Repeated falls  Left hip pain  Stiffness of hip joint, left  Stiffness of hip joint, right    Goals: (Goals have been discussed and agreed upon with patient.)  Short-Term Functional Goals: Time Frame: 4 weeks  Pt to report compliance with HEP - MET  Pt to demonstrate fluid movement patterns - MET  Pt to increase strength with R push off without pain to normalize gait  - ongoing  Discharge Goals: Time Frame: 8 weeks  Pt to increase strength for sit to stand x 30 reps without UE assist - ongoing  Pt to increase strength for reciprocal gait on stairs - ongoing  Pt to increase SLS > 20 sec B for safe amb all surfaces - ongoing  Pt to report min to no LBP with activity - ongoing    Medical/Referring Diagnosis:    Chronic left-sided low back pain without sciatica [M54.50, G89.29]    Referring Physician:  Adriana Francis MD MD Orders:  PT Eval and Treat   Return MD Appt:  Oct   Date of Onset:  Onset Date: 05/15/23     Allergies:   Meperidine  Restrictions/Precautions:   None      Interventions Planned (Treatment may consist of any combination of the following):   Balance Training, Endurance Training, Functional Mobility Training,

## 2024-05-08 ENCOUNTER — HOSPITAL ENCOUNTER (OUTPATIENT)
Dept: PHYSICAL THERAPY | Age: 74
Setting detail: RECURRING SERIES
End: 2024-05-08
Payer: MEDICARE

## 2024-05-08 NOTE — PROGRESS NOTES
Pt cancelled appt due to not being able to travel back home in bad weather.  Will resume POC at next visit.

## 2024-05-14 ENCOUNTER — HOSPITAL ENCOUNTER (OUTPATIENT)
Dept: PHYSICAL THERAPY | Age: 74
Setting detail: RECURRING SERIES
Discharge: HOME | End: 2024-05-17
Payer: MEDICARE

## 2024-05-14 PROCEDURE — 97110 THERAPEUTIC EXERCISES: CPT

## 2024-05-14 NOTE — DISCHARGE SUMMARY
modulation    AQUATIC THERAPY (see below for minutes): Aquatic treatment performed per flow grid for Decreased muscle strength, Decreased endurance, Decreased static/dynamic balance and reactive control, Decreased activity endurance, Decompression, Ease of movement and Low impact and reduced weight bearing activity.                                          Date: 4/1/24  Visit 5  PN 4/10/24  Visit 6 4/17/24  Visit 7 5/14/24  Visit 8  PN   Modalities:              Manual Therapy: 13 mins  15 mins    STM R paraspinals S/L  prone           Aquatics Activities:              Therapeutic Exercises: 40 mins 48 mins 26 mins 45 mins   Pt education, postural education, HEP, functional breathing    review   Seated trunk flex stretch X 5 X 5 X 5 X 5   Piriformis stretch B 3 x 15 sec 3 x 15 sec 3 x 15 sec 3 x 15 sec   Sit to stand X 25 X 20 X 10 X 10   Up on two, down on one X 15 X 15     Toe stepping B X 10 X 10     SLS B X 3 X 3 X 3 X 3   Tandem stance B X 1 X 1 X 4 X 3   HEP: see hand out     Treatment/Session Summary:    Treatment Assessment:  Pt has been OOT a month and stopped taking her statin drug which has helped with decreasing overall pain.  She feels she has improved enough to continue independently.  She will call with any questions.    Communication/Consultation:  None today  Equipment provided today:  None    Total Treatment Billable Duration:  45 minutes   Time In: 0130  Time Out: 0215    MARIAH MARRERO PT         Charge Capture  [employer] Portal  Appt Desk     Future Appointments   Date Time Provider Department Center   5/16/2024  1:30 PM Mariah Marrero PT [facility]   5/28/2024  1:30 PM Mariah Marrero PT [facility]   5/30/2024  1:30 PM Mariah Marrero PT [facility] [place]

## 2024-05-16 ENCOUNTER — APPOINTMENT (OUTPATIENT)
Dept: PHYSICAL THERAPY | Age: 74
End: 2024-05-16
Payer: MEDICARE

## 2024-05-28 ENCOUNTER — APPOINTMENT (OUTPATIENT)
Dept: PHYSICAL THERAPY | Age: 74
End: 2024-05-28
Payer: MEDICARE

## 2024-05-30 ENCOUNTER — APPOINTMENT (OUTPATIENT)
Dept: PHYSICAL THERAPY | Age: 74
End: 2024-05-30
Payer: MEDICARE